# Patient Record
Sex: FEMALE | Race: WHITE | NOT HISPANIC OR LATINO
[De-identification: names, ages, dates, MRNs, and addresses within clinical notes are randomized per-mention and may not be internally consistent; named-entity substitution may affect disease eponyms.]

---

## 2020-12-14 ENCOUNTER — APPOINTMENT (OUTPATIENT)
Dept: INTERNAL MEDICINE | Facility: CLINIC | Age: 60
End: 2020-12-14

## 2021-02-08 ENCOUNTER — OUTPATIENT (OUTPATIENT)
Dept: OUTPATIENT SERVICES | Facility: HOSPITAL | Age: 61
LOS: 1 days | Discharge: HOME | End: 2021-02-08

## 2021-02-08 ENCOUNTER — APPOINTMENT (OUTPATIENT)
Dept: INTERNAL MEDICINE | Facility: CLINIC | Age: 61
End: 2021-02-08
Payer: MEDICAID

## 2021-02-08 VITALS
WEIGHT: 215 LBS | SYSTOLIC BLOOD PRESSURE: 121 MMHG | HEART RATE: 84 BPM | TEMPERATURE: 97.7 F | OXYGEN SATURATION: 98 % | BODY MASS INDEX: 35.82 KG/M2 | DIASTOLIC BLOOD PRESSURE: 76 MMHG | HEIGHT: 65 IN

## 2021-02-08 DIAGNOSIS — Z00.00 ENCOUNTER FOR GENERAL ADULT MEDICAL EXAMINATION WITHOUT ABNORMAL FINDINGS: ICD-10-CM

## 2021-02-08 DIAGNOSIS — Z85.038 PERSONAL HISTORY OF OTHER MALIGNANT NEOPLASM OF LARGE INTESTINE: ICD-10-CM

## 2021-02-08 DIAGNOSIS — E66.9 OBESITY, UNSPECIFIED: ICD-10-CM

## 2021-02-08 PROCEDURE — 99213 OFFICE O/P EST LOW 20 MIN: CPT | Mod: GC

## 2021-02-08 NOTE — PHYSICAL EXAM
[No Acute Distress] : no acute distress [Well Nourished] : well nourished [Well Developed] : well developed [Normal Sclera/Conjunctiva] : normal sclera/conjunctiva [EOMI] : extraocular movements intact [Normal Outer Ear/Nose] : the outer ears and nose were normal in appearance [Normal Oropharynx] : the oropharynx was normal [Supple] : supple [No Accessory Muscle Use] : no accessory muscle use [No Respiratory Distress] : no respiratory distress  [Clear to Auscultation] : lungs were clear to auscultation bilaterally [Normal Rate] : normal rate  [Regular Rhythm] : with a regular rhythm [Normal S1, S2] : normal S1 and S2 [No Edema] : there was no peripheral edema [Soft] : abdomen soft [Non Tender] : non-tender [No CVA Tenderness] : no CVA  tenderness [No Joint Swelling] : no joint swelling [No Rash] : no rash [Coordination Grossly Intact] : coordination grossly intact [No Focal Deficits] : no focal deficits

## 2021-02-20 NOTE — HISTORY OF PRESENT ILLNESS
[FreeTextEntry1] : Establish care [de-identified] : Ms. Gonzalez is a 61 yo female patient with PMH of colon cancer s/p surgery and chemotherapy, in remission for the last 16 years, presenting today to clinic to establish care. \par \par She has no complaints at this time, and has no concerns. Denying any complaint. Only taking Vitamin OTC medication. Denies smoking, alcohol use. Noted that sometimes she feels that her right knee might give away. \par

## 2021-02-20 NOTE — ASSESSMENT
[FreeTextEntry1] : Ms. Gonzalez is a 61 yo female patient with PMH of colon cancer s/p surgery and chemotherapy, in remission for the last 16 years, presenting today to clinic to establish care. \par \par # History of colon cancer, in remission\par - no recent changes in weight, denies hematochezia, melena, abdominal pain\par - follows with an outside GI and having regular colonoscopies\par \par # Right knee pain\par - likely due to overweight\par - Taking PRN NSAIDs\par - counseled about the importance of weight loss\par \par # HCM\par - Will request mammogram\par - Will request a referral to GYN for PaP smear\par - Will check A1c, lipid panel, TSH, CMP, CBC\par - Noted that she has received Flu shot this season. \par \par RTC in 3 months. \par

## 2021-02-20 NOTE — REVIEW OF SYSTEMS
[Fatigue] : fatigue [Negative] : Neurological [Fever] : no fever [Night Sweats] : no night sweats [Nasal Discharge] : no nasal discharge [Recent Change In Weight] : ~T no recent weight change [Sore Throat] : no sore throat [Abdominal Pain] : no abdominal pain [Nausea] : no nausea [Constipation] : no constipation [Vomiting] : no vomiting

## 2021-08-02 ENCOUNTER — APPOINTMENT (OUTPATIENT)
Dept: INTERNAL MEDICINE | Facility: CLINIC | Age: 61
End: 2021-08-02

## 2021-09-30 ENCOUNTER — APPOINTMENT (OUTPATIENT)
Dept: INTERNAL MEDICINE | Facility: CLINIC | Age: 61
End: 2021-09-30

## 2021-12-22 ENCOUNTER — APPOINTMENT (OUTPATIENT)
Dept: INTERNAL MEDICINE | Facility: CLINIC | Age: 61
End: 2021-12-22

## 2021-12-28 ENCOUNTER — EMERGENCY (EMERGENCY)
Facility: HOSPITAL | Age: 61
LOS: 0 days | Discharge: HOME | End: 2021-12-28
Attending: EMERGENCY MEDICINE | Admitting: EMERGENCY MEDICINE
Payer: MEDICAID

## 2021-12-28 ENCOUNTER — OUTPATIENT (OUTPATIENT)
Dept: OUTPATIENT SERVICES | Facility: HOSPITAL | Age: 61
LOS: 1 days | Discharge: HOME | End: 2021-12-28
Payer: MEDICAID

## 2021-12-28 ENCOUNTER — APPOINTMENT (OUTPATIENT)
Dept: OPHTHALMOLOGY | Facility: CLINIC | Age: 61
End: 2021-12-28

## 2021-12-28 VITALS
HEART RATE: 87 BPM | TEMPERATURE: 97 F | SYSTOLIC BLOOD PRESSURE: 127 MMHG | RESPIRATION RATE: 18 BRPM | DIASTOLIC BLOOD PRESSURE: 65 MMHG | OXYGEN SATURATION: 98 %

## 2021-12-28 DIAGNOSIS — T24.212A BURN OF SECOND DEGREE OF LEFT THIGH, INITIAL ENCOUNTER: ICD-10-CM
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
## 2021-12-28 DIAGNOSIS — Z23 ENCOUNTER FOR IMMUNIZATION: ICD-10-CM
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
## 2021-12-28 DIAGNOSIS — T31.0 BURNS INVOLVING LESS THAN 10% OF BODY SURFACE: ICD-10-CM

## 2021-12-28 DIAGNOSIS — T24.211A BURN OF SECOND DEGREE OF RIGHT THIGH, INITIAL ENCOUNTER: ICD-10-CM
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
## 2021-12-28 PROCEDURE — 99282 EMERGENCY DEPT VISIT SF MDM: CPT

## 2021-12-28 PROCEDURE — 99284 EMERGENCY DEPT VISIT MOD MDM: CPT

## 2021-12-28 PROCEDURE — 92250 FUNDUS PHOTOGRAPHY W/I&R: CPT | Mod: 26

## 2021-12-28 PROCEDURE — 92004 COMPRE OPH EXAM NEW PT 1/>: CPT

## 2021-12-28 RX ORDER — TETANUS TOXOID, REDUCED DIPHTHERIA TOXOID AND ACELLULAR PERTUSSIS VACCINE, ADSORBED 5; 2.5; 8; 8; 2.5 [IU]/.5ML; [IU]/.5ML; UG/.5ML; UG/.5ML; UG/.5ML
0.5 SUSPENSION INTRAMUSCULAR ONCE
Refills: 0 | Status: COMPLETED | OUTPATIENT
Start: 2021-12-28 | End: 2021-12-28

## 2021-12-28 RX ADMIN — TETANUS TOXOID, REDUCED DIPHTHERIA TOXOID AND ACELLULAR PERTUSSIS VACCINE, ADSORBED 0.5 MILLILITER(S): 5; 2.5; 8; 8; 2.5 SUSPENSION INTRAMUSCULAR at 15:21

## 2021-12-28 NOTE — ED ADULT NURSE NOTE - CAS EDP DISCH TYPE
"Chief Complaint   Patient presents with     Asthma       Initial /67  Pulse 96  Temp 98.1  F (36.7  C) (Oral)  Wt 142 lb 14.4 oz (64.8 kg)  SpO2 96% Estimated body mass index is 31.67 kg/(m^2) as calculated from the following:    Height as of 8/10/17: 4' 7.75\" (1.416 m).    Weight as of 8/10/17: 140 lb (63.5 kg).  Medication Reconciliation: complete   DIONNE Cooney      " Home

## 2021-12-28 NOTE — ED PROVIDER NOTE - NSFOLLOWUPCLINICS_GEN_ALL_ED_FT
[facility]  Burn  [address], [address]  [place] [address]  Phone: [phone]  Fax:   Follow Up Time: 1-3 Days

## 2021-12-28 NOTE — ED PROVIDER NOTE - PATIENT PORTAL LINK FT
You can access the FollowMyHealth Patient Portal offered by [facility] by registering at the following website: [email or web address]. By joining [facility]’s FollowMyHealth portal, you will also be able to view your health information using other applications (apps) compatible with our system.
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
## 2021-12-28 NOTE — ED PROVIDER NOTE - NS ED ROS FT
Constitutional: (-) diaphoresis  Eyes/ENT: (-) runny nose  Cardiovascular: (-) chest pain  Respiratory: (-) cough  Gastrointestinal: (-) vomiting, (-) diarrhea  : (-) dysuria   Musculoskeletal: (-) joint pain  Integumentary: see HPI  Neurological: (-) LOC  Allergic/Immunologic: (-) pruritus
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
## 2021-12-30 ENCOUNTER — APPOINTMENT (OUTPATIENT)
Dept: BURN CARE | Facility: CLINIC | Age: 61
End: 2021-12-30
Payer: MEDICAID

## 2021-12-30 ENCOUNTER — OUTPATIENT (OUTPATIENT)
Dept: OUTPATIENT SERVICES | Facility: HOSPITAL | Age: 61
LOS: 1 days | Discharge: HOME | End: 2021-12-30

## 2021-12-30 DIAGNOSIS — T24.211A BURN OF SECOND DEGREE OF RIGHT THIGH, INITIAL ENCOUNTER: ICD-10-CM
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
## 2021-12-30 DIAGNOSIS — T24.212A BURN OF SECOND DEGREE OF LEFT THIGH, INITIAL ENCOUNTER: ICD-10-CM
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
## 2021-12-30 PROCEDURE — 99203 OFFICE O/P NEW LOW 30 MIN: CPT

## 2021-12-31 PROBLEM — T24.212A PARTIAL THICKNESS BURN OF LEFT THIGH, INITIAL ENCOUNTER: Status: ACTIVE | Noted: 2021-12-31
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
## 2021-12-31 PROBLEM — T24.211A PARTIAL THICKNESS BURN OF RIGHT THIGH, INITIAL ENCOUNTER: Status: ACTIVE | Noted: 2021-12-31

## 2021-12-31 NOTE — REASON FOR VISIT
[Initial] : initial visit [Were you seen in the Emergency Room?] : seen in the emergency room [Were you admitted to the burn center at [facility]?] : not admitted to the burn center at [facility] [Family Member] : family member [FreeTextEntry3] : ED

## 2021-12-31 NOTE — PHYSICAL EXAM
[New] : new [Size%: ______] : Size: [unfilled]% [3] : 3 out of 10 [Normal] : normal [Medium] : medium [] : yes [de-identified] : No dressings in place \par Bilateral posterior thighs - open dessicated  wounds,, intact blisters and devitalized skin ; scattered ellen of adherent sl discolored skin  [TWNoteComboBox1] : adaptic [TWNoteComboBox2] : SSD
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
## 2022-01-03 RX ORDER — AMOXICILLIN AND CLAVULANATE POTASSIUM 875; 125 MG/1; MG/1
875-125 TABLET, COATED ORAL
Qty: 14 | Refills: 0 | Status: DISCONTINUED | COMMUNITY
Start: 2021-12-30 | End: 2022-01-03

## 2022-01-03 RX ORDER — SILVER SULFADIAZINE 10 MG/G
1 CREAM TOPICAL TWICE DAILY
Qty: 1 | Refills: 0 | Status: DISCONTINUED | COMMUNITY
Start: 2021-12-30 | End: 2022-01-03

## 2022-01-06 ENCOUNTER — NON-APPOINTMENT (OUTPATIENT)
Age: 62
End: 2022-01-06

## 2022-01-06 ENCOUNTER — OUTPATIENT (OUTPATIENT)
Dept: OUTPATIENT SERVICES | Facility: HOSPITAL | Age: 62
LOS: 1 days | Discharge: HOME | End: 2022-01-06

## 2022-01-06 ENCOUNTER — APPOINTMENT (OUTPATIENT)
Dept: INTERNAL MEDICINE | Facility: CLINIC | Age: 62
End: 2022-01-06
Payer: MEDICAID

## 2022-01-06 VITALS
SYSTOLIC BLOOD PRESSURE: 115 MMHG | TEMPERATURE: 97 F | HEIGHT: 65 IN | BODY MASS INDEX: 36.32 KG/M2 | HEART RATE: 79 BPM | DIASTOLIC BLOOD PRESSURE: 74 MMHG | WEIGHT: 218 LBS | OXYGEN SATURATION: 97 %

## 2022-01-06 DIAGNOSIS — H35.62 RETINAL HEMORRHAGE, LEFT EYE: ICD-10-CM

## 2022-01-06 DIAGNOSIS — H52.4 PRESBYOPIA: ICD-10-CM

## 2022-01-06 DIAGNOSIS — T24.009A BURN OF UNSPECIFIED DEGREE OF UNSPECIFIED SITE OF UNSPECIFIED LOWER LIMB, EXCEPT ANKLE AND FOOT, INITIAL ENCOUNTER: ICD-10-CM

## 2022-01-06 DIAGNOSIS — H35.89 OTHER SPECIFIED RETINAL DISORDERS: ICD-10-CM

## 2022-01-06 DIAGNOSIS — H25.813 COMBINED FORMS OF AGE-RELATED CATARACT, BILATERAL: ICD-10-CM

## 2022-01-06 PROCEDURE — 99214 OFFICE O/P EST MOD 30 MIN: CPT | Mod: GC

## 2022-01-06 RX ORDER — CEPHALEXIN 500 MG/1
500 TABLET ORAL EVERY 8 HOURS
Qty: 15 | Refills: 0 | Status: DISCONTINUED | COMMUNITY
Start: 2022-01-05 | End: 2022-01-06

## 2022-01-06 RX ORDER — AMOXICILLIN AND CLAVULANATE POTASSIUM 875; 125 MG/1; MG/1
875-125 TABLET, COATED ORAL
Qty: 14 | Refills: 0 | Status: DISCONTINUED | COMMUNITY
Start: 2022-01-03 | End: 2022-01-06

## 2022-01-06 NOTE — END OF VISIT
[] : A student assisted with documenting this visit. I have reviewed and verified all information documented by the student, and made modifications to such information, when appropriate. [FreeTextEntry3] : Patient was seen and examined with student.  I discussed pt's medical issues and plan in detail as written above.\par
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
## 2022-01-06 NOTE — REVIEW OF SYSTEMS
[Vision Problems] : vision problems [Hearing Loss] : hearing loss [Nocturia] : nocturia [Joint Pain] : joint pain [Fever] : no fever [Chills] : no chills [Fatigue] : no fatigue [Night Sweats] : no night sweats [Recent Change In Weight] : ~T no recent weight change [Pain] : no pain [Earache] : no earache [Chest Pain] : no chest pain [Palpitations] : no palpitations [Lower Ext Edema] : no lower extremity edema [Shortness Of Breath] : no shortness of breath [Wheezing] : no wheezing [Cough] : no cough [Abdominal Pain] : no abdominal pain [Nausea] : no nausea [Constipation] : no constipation [Diarrhea] : diarrhea [Vomiting] : no vomiting [Melena] : no melena [Dysuria] : no dysuria [Hematuria] : no hematuria [Headache] : no headache [Dizziness] : no dizziness [FreeTextEntry3] : Saw opthalmology last week [FreeTextEntry4] : Does not see an ENT, does not have a hearing aid [FreeTextEntry9] : See HPI
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
## 2022-01-13 ENCOUNTER — OUTPATIENT (OUTPATIENT)
Dept: OUTPATIENT SERVICES | Facility: HOSPITAL | Age: 62
LOS: 1 days | Discharge: HOME | End: 2022-01-13

## 2022-01-13 ENCOUNTER — APPOINTMENT (OUTPATIENT)
Dept: BURN CARE | Facility: CLINIC | Age: 62
End: 2022-01-13
Payer: MEDICAID

## 2022-01-13 LAB
25(OH)D3 SERPL-MCNC: 23 NG/ML
ALBUMIN SERPL ELPH-MCNC: 3.9 G/DL
ALP BLD-CCNC: 60 U/L
ALT SERPL-CCNC: 11 U/L
ANION GAP SERPL CALC-SCNC: 16 MMOL/L
AST SERPL-CCNC: 23 U/L
BASOPHILS # BLD AUTO: 0.06 K/UL
BASOPHILS NFR BLD AUTO: 1 %
BILIRUB SERPL-MCNC: 0.4 MG/DL
BUN SERPL-MCNC: 20 MG/DL
CALCIUM SERPL-MCNC: 9.4 MG/DL
CHLORIDE SERPL-SCNC: 108 MMOL/L
CHOLEST SERPL-MCNC: 198 MG/DL
CO2 SERPL-SCNC: 21 MMOL/L
CREAT SERPL-MCNC: 0.9 MG/DL
EOSINOPHIL # BLD AUTO: 0.13 K/UL
EOSINOPHIL NFR BLD AUTO: 2.1 %
ESTIMATED AVERAGE GLUCOSE: 120 MG/DL
GLUCOSE SERPL-MCNC: 99 MG/DL
HBA1C MFR BLD HPLC: 5.8 %
HCT VFR BLD CALC: 39.1 %
HDLC SERPL-MCNC: 73 MG/DL
HGB BLD-MCNC: 12.4 G/DL
IMM GRANULOCYTES NFR BLD AUTO: 0.7 %
LDLC SERPL CALC-MCNC: 115 MG/DL
LYMPHOCYTES # BLD AUTO: 1.06 K/UL
LYMPHOCYTES NFR BLD AUTO: 17.4 %
MAN DIFF?: NORMAL
MCHC RBC-ENTMCNC: 31.7 G/DL
MCHC RBC-ENTMCNC: 32.3 PG
MCV RBC AUTO: 101.8 FL
MONOCYTES # BLD AUTO: 0.4 K/UL
MONOCYTES NFR BLD AUTO: 6.6 %
NEUTROPHILS # BLD AUTO: 4.39 K/UL
NEUTROPHILS NFR BLD AUTO: 72.2 %
NONHDLC SERPL-MCNC: 125 MG/DL
PLATELET # BLD AUTO: 190 K/UL
POTASSIUM SERPL-SCNC: 5.1 MMOL/L
PROT SERPL-MCNC: 7.4 G/DL
RBC # BLD: 3.84 M/UL
RBC # FLD: 13.6 %
SODIUM SERPL-SCNC: 145 MMOL/L
TRIGL SERPL-MCNC: 71 MG/DL
TSH SERPL-ACNC: 3 UIU/ML
WBC # FLD AUTO: 6.08 K/UL

## 2022-01-13 PROCEDURE — 99213 OFFICE O/P EST LOW 20 MIN: CPT

## 2022-01-24 DIAGNOSIS — T24.211D BURN OF SECOND DEGREE OF RIGHT THIGH, SUBSEQUENT ENCOUNTER: ICD-10-CM

## 2022-01-24 DIAGNOSIS — T24.212D BURN OF SECOND DEGREE OF LEFT THIGH, SUBSEQUENT ENCOUNTER: ICD-10-CM
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
## 2022-02-10 ENCOUNTER — OUTPATIENT (OUTPATIENT)
Dept: OUTPATIENT SERVICES | Facility: HOSPITAL | Age: 62
LOS: 1 days | Discharge: HOME | End: 2022-02-10

## 2022-02-10 ENCOUNTER — APPOINTMENT (OUTPATIENT)
Dept: BURN CARE | Facility: CLINIC | Age: 62
End: 2022-02-10
Payer: MEDICAID

## 2022-02-10 PROCEDURE — 99213 OFFICE O/P EST LOW 20 MIN: CPT

## 2022-02-18 ENCOUNTER — APPOINTMENT (OUTPATIENT)
Dept: OPHTHALMOLOGY | Facility: CLINIC | Age: 62
End: 2022-02-18

## 2022-02-18 ENCOUNTER — OUTPATIENT (OUTPATIENT)
Dept: OUTPATIENT SERVICES | Facility: HOSPITAL | Age: 62
LOS: 1 days | Discharge: HOME | End: 2022-02-18
Payer: MEDICAID

## 2022-02-18 PROCEDURE — 92014 COMPRE OPH EXAM EST PT 1/>: CPT

## 2022-03-02 ENCOUNTER — NON-APPOINTMENT (OUTPATIENT)
Age: 62
End: 2022-03-02

## 2022-03-04 ENCOUNTER — APPOINTMENT (OUTPATIENT)
Dept: OPHTHALMOLOGY | Facility: CLINIC | Age: 62
End: 2022-03-04

## 2022-03-04 ENCOUNTER — OUTPATIENT (OUTPATIENT)
Dept: OUTPATIENT SERVICES | Facility: HOSPITAL | Age: 62
LOS: 1 days | Discharge: HOME | End: 2022-03-04
Payer: MEDICAID

## 2022-03-04 PROCEDURE — 92134 CPTRZ OPH DX IMG PST SGM RTA: CPT | Mod: 26

## 2022-03-04 PROCEDURE — 67028 INJECTION EYE DRUG: CPT | Mod: LT

## 2022-03-04 PROCEDURE — 92012 INTRM OPH EXAM EST PATIENT: CPT | Mod: 25

## 2022-03-04 PROCEDURE — 92235 FLUORESCEIN ANGRPH MLTIFRAME: CPT | Mod: 26

## 2022-03-09 DIAGNOSIS — H35.3220 EXUDATIVE AGE-RELATED MACULAR DEGENERATION, LEFT EYE, STAGE UNSPECIFIED: ICD-10-CM

## 2022-03-09 DIAGNOSIS — H25.13 AGE-RELATED NUCLEAR CATARACT, BILATERAL: ICD-10-CM

## 2022-03-09 DIAGNOSIS — H34.8322 TRIBUTARY (BRANCH) RETINAL VEIN OCCLUSION, LEFT EYE, STABLE: ICD-10-CM

## 2022-03-17 ENCOUNTER — NON-APPOINTMENT (OUTPATIENT)
Age: 62
End: 2022-03-17

## 2022-03-23 ENCOUNTER — APPOINTMENT (OUTPATIENT)
Dept: OPHTHALMOLOGY | Facility: CLINIC | Age: 62
End: 2022-03-23

## 2022-03-25 ENCOUNTER — OUTPATIENT (OUTPATIENT)
Dept: OUTPATIENT SERVICES | Facility: HOSPITAL | Age: 62
LOS: 1 days | Discharge: HOME | End: 2022-03-25
Payer: MEDICAID

## 2022-03-25 ENCOUNTER — APPOINTMENT (OUTPATIENT)
Dept: OPHTHALMOLOGY | Facility: CLINIC | Age: 62
End: 2022-03-25

## 2022-03-25 PROCEDURE — 92134 CPTRZ OPH DX IMG PST SGM RTA: CPT | Mod: 26

## 2022-03-25 PROCEDURE — 67028 INJECTION EYE DRUG: CPT | Mod: LT

## 2022-03-25 PROCEDURE — 92012 INTRM OPH EXAM EST PATIENT: CPT | Mod: 25

## 2022-03-25 PROCEDURE — 92201 OPSCPY EXTND RTA DRAW UNI/BI: CPT

## 2022-03-30 ENCOUNTER — NON-APPOINTMENT (OUTPATIENT)
Age: 62
End: 2022-03-30

## 2022-03-30 ENCOUNTER — OUTPATIENT (OUTPATIENT)
Dept: OUTPATIENT SERVICES | Facility: HOSPITAL | Age: 62
LOS: 1 days | Discharge: HOME | End: 2022-03-30

## 2022-03-30 ENCOUNTER — APPOINTMENT (OUTPATIENT)
Dept: INTERNAL MEDICINE | Facility: CLINIC | Age: 62
End: 2022-03-30
Payer: MEDICAID

## 2022-03-30 ENCOUNTER — LABORATORY RESULT (OUTPATIENT)
Age: 62
End: 2022-03-30

## 2022-03-30 VITALS
WEIGHT: 214 LBS | OXYGEN SATURATION: 100 % | HEIGHT: 65 IN | BODY MASS INDEX: 35.65 KG/M2 | HEART RATE: 69 BPM | TEMPERATURE: 98 F | SYSTOLIC BLOOD PRESSURE: 123 MMHG | DIASTOLIC BLOOD PRESSURE: 78 MMHG

## 2022-03-30 LAB
BASOPHILS # BLD AUTO: 0.03 K/UL
BASOPHILS NFR BLD AUTO: 0.7 %
EOSINOPHIL # BLD AUTO: 0.04 K/UL
EOSINOPHIL NFR BLD AUTO: 0.9 %
HCT VFR BLD CALC: 34.1 %
HGB BLD-MCNC: 12.4 G/DL
IMM GRANULOCYTES NFR BLD AUTO: 0.2 %
LYMPHOCYTES # BLD AUTO: 1.13 K/UL
LYMPHOCYTES NFR BLD AUTO: 26.2 %
MAN DIFF?: NORMAL
MCHC RBC-ENTMCNC: 36.4 G/DL
MCHC RBC-ENTMCNC: 38.3 PG
MCV RBC AUTO: 105.2 FL
MONOCYTES # BLD AUTO: 0.47 K/UL
MONOCYTES NFR BLD AUTO: 10.9 %
NEUTROPHILS # BLD AUTO: 2.63 K/UL
NEUTROPHILS NFR BLD AUTO: 61.1 %
PLATELET # BLD AUTO: 199 K/UL
RBC # BLD: 3.24 M/UL
RBC # FLD: 13.2 %
WBC # FLD AUTO: 4.31 K/UL

## 2022-03-30 PROCEDURE — 99214 OFFICE O/P EST MOD 30 MIN: CPT | Mod: GC

## 2022-04-06 ENCOUNTER — NON-APPOINTMENT (OUTPATIENT)
Age: 62
End: 2022-04-06

## 2022-04-06 NOTE — PHYSICAL EXAM
[No Acute Distress] : no acute distress [Well-Appearing] : well-appearing [Normal Outer Ear/Nose] : the outer ears and nose were normal in appearance [Normal] : affect was normal and insight and judgment were intact [de-identified] : overweight [de-identified] : cerumen obstructing tympanic membrane of L ear, R ear clear, no edema/erythema

## 2022-04-06 NOTE — ASSESSMENT
[FreeTextEntry1] : Ms. Gonzalez is a 59 yo female patient with PMH of colon cancer s/p surgery and chemotherapy, in remission for the last 16 years, presenting today for annual comprehensive checkup.\par \par # History of colon cancer, in remission\par - no recent changes in weight, denies hematochezia, melena, abdominal pain\par - follows with an outside GI and having regular colonoscopies, has appt in May\par \par # Right knee pain\par - likely due to overweight\par - Taking PRN NSAIDs\par - counseled about the importance of weight loss\par \par # Hearing loss\par - Ear drops for cerumen impaction\par \par # HCM\par - Will request mammogram\par - Will request a referral to GYN for PaP smear\par - Will check A1c, lipid panel, TSH, CMP, CBC\par - Noted that she has received Flu shot this season. \par \par #Prediabetes\par - A1C 5.8, counseled on avoiding sugary beverages\par \par #DLD\par - \par \par RTC in 3 months.

## 2022-04-06 NOTE — REVIEW OF SYSTEMS
[Redness] : redness [Hearing Loss] : hearing loss [Memory Loss] : memory loss [Negative] : Gastrointestinal [Discharge] : no discharge [Pain] : no pain [Vision Problems] : no vision problems [Earache] : no earache [Nasal Discharge] : no nasal discharge [Headache] : no headache [Dizziness] : no dizziness

## 2022-04-06 NOTE — HISTORY OF PRESENT ILLNESS
[Family Member] : family member [FreeTextEntry1] : annual exam [de-identified] : Patient is here with sister Keke who is her legal guardian. Keke reports that patient has had progressive hearing loss over the past few months as well as worsening memory. Keke also reports that the patient is experiencing worsening knee pain that is helped after she takes Alleve.

## 2022-04-07 ENCOUNTER — APPOINTMENT (OUTPATIENT)
Dept: INTERNAL MEDICINE | Facility: CLINIC | Age: 62
End: 2022-04-07

## 2022-04-08 ENCOUNTER — NON-APPOINTMENT (OUTPATIENT)
Age: 62
End: 2022-04-08

## 2022-04-08 LAB
APPEARANCE: CLEAR
BILIRUBIN URINE: NEGATIVE
BLOOD URINE: ABNORMAL
COLOR: YELLOW
ESTIMATED AVERAGE GLUCOSE: 120 MG/DL
GLUCOSE QUALITATIVE U: NORMAL
HBA1C MFR BLD HPLC: 5.8 %
HBV SURFACE AB SER QL: NONREACTIVE
HBV SURFACE AB SERPL IA-ACNC: <3 MIU/ML
HBV SURFACE AG SER QL: NONREACTIVE
KETONES URINE: NEGATIVE
LEUKOCYTE ESTERASE URINE: NEGATIVE
M TB IFN-G BLD-IMP: NEGATIVE
MEV IGG FLD QL IA: 33.6 AU/ML
MEV IGG+IGM SER-IMP: POSITIVE
MUV AB SER-ACNC: POSITIVE
MUV IGG SER QL IA: 34.2 AU/ML
NITRITE URINE: NEGATIVE
PH URINE: 6
PROTEIN URINE: ABNORMAL
QUANTIFERON TB PLUS MITOGEN MINUS NIL: 2.05 IU/ML
QUANTIFERON TB PLUS NIL: 0 IU/ML
QUANTIFERON TB PLUS TB1 MINUS NIL: 0 IU/ML
QUANTIFERON TB PLUS TB2 MINUS NIL: 0 IU/ML
RUBV IGG FLD-ACNC: 2.2 INDEX
RUBV IGG SER-IMP: POSITIVE
SPECIFIC GRAVITY URINE: 1.03
UROBILINOGEN URINE: NORMAL
VZV AB TITR SER: POSITIVE
VZV IGG SER IF-ACNC: 3076 INDEX

## 2022-04-11 DIAGNOSIS — H61.20 IMPACTED CERUMEN, UNSPECIFIED EAR: ICD-10-CM

## 2022-04-11 DIAGNOSIS — Z00.00 ENCOUNTER FOR GENERAL ADULT MEDICAL EXAMINATION WITHOUT ABNORMAL FINDINGS: ICD-10-CM

## 2022-04-11 DIAGNOSIS — E66.9 OBESITY, UNSPECIFIED: ICD-10-CM

## 2022-04-12 ENCOUNTER — MED ADMIN CHARGE (OUTPATIENT)
Age: 62
End: 2022-04-12

## 2022-04-12 ENCOUNTER — OUTPATIENT (OUTPATIENT)
Dept: OUTPATIENT SERVICES | Facility: HOSPITAL | Age: 62
LOS: 1 days | Discharge: HOME | End: 2022-04-12

## 2022-04-12 ENCOUNTER — APPOINTMENT (OUTPATIENT)
Dept: NEUROLOGY | Facility: CLINIC | Age: 62
End: 2022-04-12
Payer: MEDICAID

## 2022-04-12 VITALS
HEART RATE: 72 BPM | HEIGHT: 66 IN | BODY MASS INDEX: 34.87 KG/M2 | TEMPERATURE: 98.2 F | DIASTOLIC BLOOD PRESSURE: 83 MMHG | WEIGHT: 217 LBS | OXYGEN SATURATION: 98 % | SYSTOLIC BLOOD PRESSURE: 139 MMHG

## 2022-04-12 PROCEDURE — 99204 OFFICE O/P NEW MOD 45 MIN: CPT | Mod: GC

## 2022-04-12 NOTE — END OF VISIT
[] : Resident [FreeTextEntry3] : Patient examined.  Suspect early dementia (AD).  Will get imaging and dementia labs and f/u with ENT for ear wax removal.\par Anticipate needing donepezil soon. Will f/u after above.

## 2022-04-12 NOTE — REVIEW OF SYSTEMS
[Memory Lapses or Loss] : memory loss [Decr. Concentrating Ability] : decreased concentrating ability [Change In Personality] : personality change [Loss Of Hearing] : hearing loss [Negative] : Respiratory [Confused or Disoriented] : no confusion [Difficulty with Language] : no ~M difficulty with language [Changed Thought Patterns] : no change in thought patterns [Repeating Questions] : no repeated questioning about recent events [Facial Weakness] : no facial weakness [Arm Weakness] : no arm weakness [Hand Weakness] : no hand weakness [Leg Weakness] : no leg weakness [Poor Coordination] : good coordination [Difficulty Writing] : no difficulty writing [Difficulties in Speech] : no speech difficulties [Numbness] : no numbness [Tingling] : no tingling [Abnormal Sensation] : no abnormal sensation [Hypersensitivity] : no hypersensitivity [Seizures] : no convulsions [Dizziness] : no dizziness [Fainting] : no fainting [Lightheadedness] : no lightheadedness [Vertigo] : no vertigo [Cluster Headache] : no cluster headache [Migraine Headache] : no migraine headache [Tension Headache] : no tension-type headache [Difficulty Walking] : no difficulty walking [Inability to Walk] : able to walk [Ataxia] : no ataxia [Frequent Falls] : not falling [Limping] : not limping [Earache] : no earache [Nosebleeds] : no nosebleeds [Nasal Discharge] : no nasal discharge [Sore Throat] : no sore throat [Hoarseness] : no hoarseness [de-identified] : Anger Outbursts (Non-violent) only verbal frustration and venting

## 2022-04-12 NOTE — HISTORY OF PRESENT ILLNESS
[FreeTextEntry1] : Patient is here with sister Keke who is her legal guardian. Keke reports that patient has had progressive hearing loss over the past few months as well as worsening memory. Keke also reports that the patient is experiencing anger outburst secondary to inability to remember where she placed items for the past 6 months. \par

## 2022-04-12 NOTE — ASSESSMENT
[FreeTextEntry1] : Ms. Gonzalez is a 59 yo female patient with PMH of colon cancer s/p surgery and chemotherapy, in remission for the last 16 years, referred to us for 6 months history of memory loss. \par \par # Worsening Memory Loss &  Anger Outbursts\par - Early onset Dementia (Hx. of Alzheimer's in mother) vs. Secondary to hearing loss \par - Evaluate hearing loss after cerumen disimpaction by ENT\par - Route Head CT, folate, b12, TSH \par - TdaP given today \par - RTC in 3 months\par

## 2022-04-12 NOTE — PHYSICAL EXAM
[General Appearance - Alert] : alert [General Appearance - In No Acute Distress] : in no acute distress [General Appearance - Well Nourished] : well nourished [General Appearance - Well Developed] : well developed [General Appearance - Well-Appearing] : healthy appearing [] : normal voice and communication [Oriented To Time, Place, And Person] : oriented to person, place, and time [Impaired Insight] : insight and judgment were intact [Affect] : the affect was normal [Mood] : the mood was normal [Memory Recent] : recent memory was not impaired [Memory Remote] : remote memory was not impaired [Person] : oriented to person [Place] : oriented to place [Time] : oriented to time [Remote Intact] : remote memory intact [Registration Intact] : recent registration memory intact [Span Intact] : the attention span was normal [Concentration Intact] : normal concentrating ability [Visual Intact] : visual attention was ~T not ~L decreased [Naming Objects] : no difficulty naming common objects [Repeating Phrases] : no difficulty repeating a phrase [Writing A Sentence] : no difficulty writing a sentence [Fluency] : fluency intact [Comprehension] : comprehension intact [Reading] : reading intact [Current Events] : adequate knowledge of current events [Past History] : adequate knowledge of personal past history [Vocabulary] : adequate range of vocabulary [Cranial Nerves Optic (II)] : visual acuity intact bilaterally,  visual fields full to confrontation, pupils equal round and reactive to light [Cranial Nerves Oculomotor (III)] : extraocular motion intact [Cranial Nerves Trigeminal (V)] : facial sensation intact symmetrically [Cranial Nerves Facial (VII)] : face symmetrical [Cranial Nerves Vestibulocochlear (VIII)] : hearing was intact bilaterally [Cranial Nerves Glossopharyngeal (IX)] : tongue and palate midline [Cranial Nerves Accessory (XI - Cranial And Spinal)] : head turning and shoulder shrug symmetric [Cranial Nerves Hypoglossal (XII)] : there was no tongue deviation with protrusion [Motor Tone] : muscle tone was normal in all four extremities [Motor Strength] : muscle strength was normal in all four extremities [Involuntary Movements] : no involuntary movements were seen [No Muscle Atrophy] : normal bulk in all four extremities [Motor Handedness Right-Handed] : the patient is right hand dominant [Sensation Tactile Decrease] : light touch was intact [Sensation Vibration Decrease] : vibration was intact [Abnormal Walk] : normal gait [Balance] : balance was intact [2+] : Patella left 2+ [Optic Disc Abnormality] : the optic disc were normal in size and color [Respiration, Rhythm And Depth] : normal respiratory rhythm and effort [Auscultation Breath Sounds / Voice Sounds] : lungs were clear to auscultation bilaterally [Chest Palpation] : palpation of the chest revealed no abnormalities [Lungs Percussion] : the lungs were normal to percussion [Apical Impulse] : the apical impulse was normal [Heart Sounds] : normal S1 and S2 [FreeTextEntry1] : 2/3 on recall item test  [Short Term Intact] : short term memory impaired [Paresis Pronator Drift Right-Sided] : no pronator drift on the right [Paresis Pronator Drift Left-Sided] : no pronator drift on the left [Motor Strength Upper Extremities Right] : strength was normal in the right upper extremity [Motor Strength Upper Extremities Left] : strength was normal in the left upper extremity [Motor Strength Lower Extremities Right] : strength was normal in the right lower extremity [Motor Strength Lower Extremities Left] : strength was normal in the left lower extremity [Romberg's Sign] : Romberg's sign was negtive [Dysesthesia] : no dysesthesia

## 2022-04-13 ENCOUNTER — OUTPATIENT (OUTPATIENT)
Dept: OUTPATIENT SERVICES | Facility: HOSPITAL | Age: 62
LOS: 1 days | Discharge: HOME | End: 2022-04-13
Payer: MEDICAID

## 2022-04-13 ENCOUNTER — APPOINTMENT (OUTPATIENT)
Dept: OPHTHALMOLOGY | Facility: CLINIC | Age: 62
End: 2022-04-13

## 2022-04-13 PROCEDURE — 92012 INTRM OPH EXAM EST PATIENT: CPT

## 2022-04-14 DIAGNOSIS — H35.89 OTHER SPECIFIED RETINAL DISORDERS: ICD-10-CM

## 2022-04-14 DIAGNOSIS — H35.62 RETINAL HEMORRHAGE, LEFT EYE: ICD-10-CM

## 2022-04-14 DIAGNOSIS — H52.4 PRESBYOPIA: ICD-10-CM

## 2022-04-19 ENCOUNTER — NON-APPOINTMENT (OUTPATIENT)
Age: 62
End: 2022-04-19

## 2022-04-29 ENCOUNTER — NON-APPOINTMENT (OUTPATIENT)
Age: 62
End: 2022-04-29

## 2022-05-03 ENCOUNTER — NON-APPOINTMENT (OUTPATIENT)
Age: 62
End: 2022-05-03

## 2022-05-11 ENCOUNTER — NON-APPOINTMENT (OUTPATIENT)
Age: 62
End: 2022-05-11

## 2022-05-13 ENCOUNTER — NON-APPOINTMENT (OUTPATIENT)
Age: 62
End: 2022-05-13

## 2022-06-24 ENCOUNTER — OUTPATIENT (OUTPATIENT)
Dept: OUTPATIENT SERVICES | Facility: HOSPITAL | Age: 62
LOS: 1 days | Discharge: HOME | End: 2022-06-24

## 2022-06-24 ENCOUNTER — APPOINTMENT (OUTPATIENT)
Dept: OPHTHALMOLOGY | Facility: CLINIC | Age: 62
End: 2022-06-24

## 2022-06-24 PROCEDURE — 92014 COMPRE OPH EXAM EST PT 1/>: CPT

## 2022-06-24 PROCEDURE — 92201 OPSCPY EXTND RTA DRAW UNI/BI: CPT

## 2022-06-24 PROCEDURE — 92134 CPTRZ OPH DX IMG PST SGM RTA: CPT | Mod: 26

## 2022-06-29 ENCOUNTER — OUTPATIENT (OUTPATIENT)
Dept: OUTPATIENT SERVICES | Facility: HOSPITAL | Age: 62
LOS: 1 days | Discharge: HOME | End: 2022-06-29

## 2022-06-29 ENCOUNTER — APPOINTMENT (OUTPATIENT)
Dept: OPHTHALMOLOGY | Facility: CLINIC | Age: 62
End: 2022-06-29

## 2022-06-29 PROCEDURE — 92012 INTRM OPH EXAM EST PATIENT: CPT

## 2022-06-29 PROCEDURE — 92201 OPSCPY EXTND RTA DRAW UNI/BI: CPT

## 2022-06-29 PROCEDURE — 92134 CPTRZ OPH DX IMG PST SGM RTA: CPT | Mod: 26

## 2022-06-30 ENCOUNTER — NON-APPOINTMENT (OUTPATIENT)
Age: 62
End: 2022-06-30

## 2022-06-30 DIAGNOSIS — H52.7 UNSPECIFIED DISORDER OF REFRACTION: ICD-10-CM

## 2022-06-30 DIAGNOSIS — H25.13 AGE-RELATED NUCLEAR CATARACT, BILATERAL: ICD-10-CM

## 2022-06-30 DIAGNOSIS — H34.8322 TRIBUTARY (BRANCH) RETINAL VEIN OCCLUSION, LEFT EYE, STABLE: ICD-10-CM

## 2022-07-01 ENCOUNTER — OUTPATIENT (OUTPATIENT)
Dept: OUTPATIENT SERVICES | Facility: HOSPITAL | Age: 62
LOS: 1 days | Discharge: HOME | End: 2022-07-01

## 2022-07-01 ENCOUNTER — APPOINTMENT (OUTPATIENT)
Dept: INTERNAL MEDICINE | Facility: CLINIC | Age: 62
End: 2022-07-01

## 2022-07-01 VITALS
OXYGEN SATURATION: 96 % | HEIGHT: 66 IN | HEART RATE: 92 BPM | TEMPERATURE: 97.9 F | DIASTOLIC BLOOD PRESSURE: 68 MMHG | WEIGHT: 223 LBS | BODY MASS INDEX: 35.84 KG/M2 | SYSTOLIC BLOOD PRESSURE: 102 MMHG

## 2022-07-01 DIAGNOSIS — H11.32 CONJUNCTIVAL HEMORRHAGE, LEFT EYE: ICD-10-CM

## 2022-07-01 PROCEDURE — 99214 OFFICE O/P EST MOD 30 MIN: CPT | Mod: GC

## 2022-07-01 NOTE — ASSESSMENT
[FreeTextEntry1] : 63 yo F patient with a PMH of prediabetes and colon cancer s/p resection 30 years ago, presents for skin pimple over her left breast.\par \par # Left breast skin Pimple\par Does not seem to be infected\par No underlying palpable mass, discharge, or any lymphadenopathy\par Will do a mammogram AP and lateral as she is also due\par \par # Right sided knee arthralgia\par No red flags or concerns of septic/ inflammatory arthritis\par Might be due to underlying osteoarthritis\par Will order right knee xray AP and lateral\par \par # Left-sided subconjunctival hemorrhage\par Due to left intravitreal injections a couple of days ago\par Surveillance only\par \par # Prediabetes\par Instructed about dietary modifications and exercise as tolerated\par \par Patient is seeing the GI next month and might get a referral for colonoscopy\par Will need a Pap smear\par Vaccinations are up-to-date

## 2022-07-01 NOTE — REVIEW OF SYSTEMS
[Negative] : Heme/Lymph [Joint Pain] : joint pain [FreeTextEntry9] : Right knee pain, mechanical in nature, no inflammatory features

## 2022-07-01 NOTE — PHYSICAL EXAM
[No Acute Distress] : no acute distress [Well Nourished] : well nourished [Well Developed] : well developed [Well-Appearing] : well-appearing [No JVD] : no jugular venous distention [No Lymphadenopathy] : no lymphadenopathy [Normal Rate] : normal rate  [Regular Rhythm] : with a regular rhythm [Normal S1, S2] : normal S1 and S2 [Normal Appearance] : normal in appearance [No Nipple Discharge] : no nipple discharge [No Axillary Lymphadenopathy] : no axillary lymphadenopathy [Soft] : abdomen soft [Non Tender] : non-tender [Non-distended] : non-distended [No Joint Swelling] : no joint swelling [Grossly Normal Strength/Tone] : grossly normal strength/tone [Speech Grossly Normal] : speech grossly normal [Normal Affect] : the affect was normal [Alert and Oriented x3] : oriented to person, place, and time [Normal Insight/Judgement] : insight and judgment were intact [de-identified] : Left-sided subconjunctival hemorrhage [de-identified] : Blue pimple over the left breast skin [de-identified] : No features of inflammation over the left knee

## 2022-07-01 NOTE — HISTORY OF PRESENT ILLNESS
[Family Member] : family member [FreeTextEntry8] : 61 yo F patient with a PMH of prediabetes and colon cancer in remission, presents to the clinic for a complaint of left breast cutaneous pimple.\par \par Patient noted that she had a bluish pimple over her left breast's skin, no erythema, induration, underlying palpable masses, swelling in the left armpit, or any discharges from the nipple.\par \par Patient also noted right knee pain, that is present on exertion, relieved on rest, non-radiating, with no associated symptoms, no red flags, no other arthralgias, no constitutional symptoms.\par \par ROS is negative.

## 2022-07-05 DIAGNOSIS — M25.561 PAIN IN RIGHT KNEE: ICD-10-CM

## 2022-07-05 DIAGNOSIS — H11.32 CONJUNCTIVAL HEMORRHAGE, LEFT EYE: ICD-10-CM

## 2022-07-05 DIAGNOSIS — R73.03 PREDIABETES: ICD-10-CM

## 2022-07-05 DIAGNOSIS — R23.8 OTHER SKIN CHANGES: ICD-10-CM

## 2022-07-05 DIAGNOSIS — M25.562 PAIN IN LEFT KNEE: ICD-10-CM

## 2022-08-10 ENCOUNTER — APPOINTMENT (OUTPATIENT)
Dept: OPHTHALMOLOGY | Facility: CLINIC | Age: 62
End: 2022-08-10

## 2022-08-10 ENCOUNTER — OUTPATIENT (OUTPATIENT)
Dept: OUTPATIENT SERVICES | Facility: HOSPITAL | Age: 62
LOS: 1 days | Discharge: HOME | End: 2022-08-10

## 2022-08-10 PROCEDURE — 92134 CPTRZ OPH DX IMG PST SGM RTA: CPT | Mod: 26

## 2022-08-10 PROCEDURE — 92012 INTRM OPH EXAM EST PATIENT: CPT | Mod: 25

## 2022-08-10 PROCEDURE — 92202 OPSCPY EXTND ON/MAC DRAW: CPT

## 2022-08-10 PROCEDURE — 67028 INJECTION EYE DRUG: CPT | Mod: LT

## 2022-08-29 ENCOUNTER — OUTPATIENT (OUTPATIENT)
Dept: OUTPATIENT SERVICES | Facility: HOSPITAL | Age: 62
LOS: 1 days | Discharge: HOME | End: 2022-08-29

## 2022-08-29 ENCOUNTER — APPOINTMENT (OUTPATIENT)
Dept: PODIATRY | Facility: CLINIC | Age: 62
End: 2022-08-29

## 2022-08-29 DIAGNOSIS — M77.52 OTHER ENTHESOPATHY OF LT FOOT AND ANKLE: ICD-10-CM

## 2022-08-29 DIAGNOSIS — M79.89 OTHER SPECIFIED SOFT TISSUE DISORDERS: ICD-10-CM

## 2022-08-29 PROCEDURE — 99203 OFFICE O/P NEW LOW 30 MIN: CPT | Mod: NC,GC

## 2022-08-30 PROBLEM — M77.52 BURSITIS OF LEFT FOOT: Status: ACTIVE | Noted: 2022-08-30

## 2022-08-30 PROBLEM — M79.89 SOFT TISSUE MASS: Status: ACTIVE | Noted: 2022-08-29

## 2022-08-30 NOTE — ASSESSMENT
[FreeTextEntry1] : 62 F presents with Left foot soft tissue mass. \par \par Rx: MRI left foot. rule out bursitis vs cyst. \par \par RTC in 2 weeks.

## 2022-08-30 NOTE — HISTORY OF PRESENT ILLNESS
[FreeTextEntry1] : Pt presents with lump on the lateral aspect for the left foot. Reports at least a month no pain no redness.   Has not had this problem before. \par \par Denies fever, N , V, SOB, C, CP.

## 2022-08-30 NOTE — END OF VISIT
[] : Resident [FreeTextEntry3] : soft tissue mass lateral to the 5th styloid. Well circumscribed mass measuring approximately 2cm. non tender. no overlying skin changes. non-tender\par referred for MRI \par Follow up after MRI to discuss possible surgical management vs corticosteriod injection

## 2022-08-30 NOTE — PHYSICAL EXAM
[2+] : left foot dorsalis pedis 2+ [Skin Color & Pigmentation] : normal skin color and pigmentation [FreeTextEntry1] : Left lateral foto at the 5th metatarsal base

## 2022-09-06 ENCOUNTER — APPOINTMENT (OUTPATIENT)
Dept: NEUROLOGY | Facility: CLINIC | Age: 62
End: 2022-09-06

## 2022-09-12 ENCOUNTER — APPOINTMENT (OUTPATIENT)
Dept: PODIATRY | Facility: CLINIC | Age: 62
End: 2022-09-12

## 2022-09-23 ENCOUNTER — NON-APPOINTMENT (OUTPATIENT)
Age: 62
End: 2022-09-23

## 2022-09-28 ENCOUNTER — NON-APPOINTMENT (OUTPATIENT)
Age: 62
End: 2022-09-28

## 2022-09-29 ENCOUNTER — NON-APPOINTMENT (OUTPATIENT)
Age: 62
End: 2022-09-29

## 2022-10-04 ENCOUNTER — OUTPATIENT (OUTPATIENT)
Dept: OUTPATIENT SERVICES | Facility: HOSPITAL | Age: 62
LOS: 1 days | Discharge: HOME | End: 2022-10-04

## 2022-10-04 ENCOUNTER — APPOINTMENT (OUTPATIENT)
Dept: SPEECH THERAPY | Facility: CLINIC | Age: 62
End: 2022-10-04

## 2022-10-04 DIAGNOSIS — H90.3 SENSORINEURAL HEARING LOSS, BILATERAL: ICD-10-CM

## 2022-10-05 ENCOUNTER — APPOINTMENT (OUTPATIENT)
Dept: OPHTHALMOLOGY | Facility: CLINIC | Age: 62
End: 2022-10-05

## 2022-10-05 ENCOUNTER — OUTPATIENT (OUTPATIENT)
Dept: OUTPATIENT SERVICES | Facility: HOSPITAL | Age: 62
LOS: 1 days | Discharge: HOME | End: 2022-10-05

## 2022-10-05 LAB
M TB IFN-G BLD-IMP: NEGATIVE
QUANTIFERON TB PLUS MITOGEN MINUS NIL: 6.14 IU/ML
QUANTIFERON TB PLUS NIL: 0.02 IU/ML
QUANTIFERON TB PLUS TB1 MINUS NIL: 0.01 IU/ML
QUANTIFERON TB PLUS TB2 MINUS NIL: 0.03 IU/ML

## 2022-10-05 PROCEDURE — 67028 INJECTION EYE DRUG: CPT | Mod: LT

## 2022-10-05 PROCEDURE — 92012 INTRM OPH EXAM EST PATIENT: CPT | Mod: 25

## 2022-10-05 PROCEDURE — 92134 CPTRZ OPH DX IMG PST SGM RTA: CPT | Mod: 26

## 2022-10-05 RX ORDER — BEVACIZUMAB 400 MG/16ML
0.05 INJECTION, SOLUTION INTRAVENOUS ONCE
Refills: 0 | Status: DISCONTINUED | OUTPATIENT
Start: 2022-10-05 | End: 2022-10-19

## 2022-10-20 ENCOUNTER — NON-APPOINTMENT (OUTPATIENT)
Age: 62
End: 2022-10-20

## 2022-10-24 ENCOUNTER — NON-APPOINTMENT (OUTPATIENT)
Age: 62
End: 2022-10-24

## 2022-11-02 ENCOUNTER — NON-APPOINTMENT (OUTPATIENT)
Age: 62
End: 2022-11-02

## 2022-11-02 ENCOUNTER — APPOINTMENT (OUTPATIENT)
Dept: GASTROENTEROLOGY | Facility: CLINIC | Age: 62
End: 2022-11-02

## 2022-11-02 ENCOUNTER — OUTPATIENT (OUTPATIENT)
Dept: OUTPATIENT SERVICES | Facility: HOSPITAL | Age: 62
LOS: 1 days | Discharge: HOME | End: 2022-11-02

## 2022-11-02 VITALS
BODY MASS INDEX: 35.84 KG/M2 | HEIGHT: 66 IN | OXYGEN SATURATION: 98 % | SYSTOLIC BLOOD PRESSURE: 132 MMHG | TEMPERATURE: 97.9 F | WEIGHT: 223 LBS | HEART RATE: 71 BPM | DIASTOLIC BLOOD PRESSURE: 81 MMHG

## 2022-11-02 DIAGNOSIS — Z78.9 OTHER SPECIFIED HEALTH STATUS: ICD-10-CM

## 2022-11-02 PROCEDURE — 99204 OFFICE O/P NEW MOD 45 MIN: CPT | Mod: GC

## 2022-11-02 NOTE — REVIEW OF SYSTEMS
[As Noted in HPI] : as noted in HPI [Fever] : no fever [Chills] : no chills [Loss Of Hearing] : no hearing loss [Chest Pain] : no chest pain [Palpitations] : no palpitations [Shortness Of Breath] : no shortness of breath [SOB on Exertion] : no shortness of breath during exertion [Abdominal Pain] : no abdominal pain [Vomiting] : no vomiting [Constipation] : no constipation [Heartburn] : no heartburn [Bleeding] : no bleeding [Bloating (gassiness)] : no bloating [Confused] : no confusion [Convulsions] : no convulsions [Dizziness] : no dizziness [Anxiety] : no anxiety [Depression] : no depression

## 2022-11-02 NOTE — HISTORY OF PRESENT ILLNESS
[FreeTextEntry1] : 62 year old female with PMH of Colon cancer s/p Rt hemicolectomy and chemo 25 years ago. was following up with Dr Springer for surveillance colonoscopy, last colonoscopy was > 5 years ago. Presents today for regular follow up for surveillance colonoscopy. Denies any hematochezia, melena, weight loss, abd pain or any other gi complaints.

## 2022-11-02 NOTE — PHYSICAL EXAM
[Alert] : alert [Normal Voice/Communication] : normal voice/communication [Healthy Appearing] : healthy appearing [Sclera] : the sclera and conjunctiva were normal [Hearing Threshold Finger Rub Not [place]] : hearing was normal [Normal Lips/Gums] : the lips and gums were normal [Oropharynx] : the oropharynx was normal [Normal Appearance] : the appearance of the neck was normal [No Neck Mass] : no neck mass was observed [No Respiratory Distress] : no respiratory distress [No Acc Muscle Use] : no accessory muscle use [Respiration, Rhythm And Depth] : normal respiratory rhythm and effort [Auscultation Breath Sounds / Voice Sounds] : lungs were clear to auscultation bilaterally [Heart Rate And Rhythm] : heart rate was normal and rhythm regular [Normal S1, S2] : normal S1 and S2 [Murmurs] : no murmurs [Bowel Sounds] : normal bowel sounds [Abdomen Tenderness] : non-tender [No Masses] : no abdominal mass palpated [Abdomen Soft] : soft [No CVA Tenderness] : no CVA  tenderness [Abnormal Walk] : normal gait [Normal Color / Pigmentation] : normal skin color and pigmentation [Oriented To Time, Place, And Person] : oriented to person, place, and time

## 2022-11-09 DIAGNOSIS — Z12.11 ENCOUNTER FOR SCREENING FOR MALIGNANT NEOPLASM OF COLON: ICD-10-CM

## 2022-11-09 DIAGNOSIS — Z78.9 OTHER SPECIFIED HEALTH STATUS: ICD-10-CM

## 2022-11-16 ENCOUNTER — APPOINTMENT (OUTPATIENT)
Dept: OPHTHALMOLOGY | Facility: CLINIC | Age: 62
End: 2022-11-16

## 2022-11-16 ENCOUNTER — OUTPATIENT (OUTPATIENT)
Dept: OUTPATIENT SERVICES | Facility: HOSPITAL | Age: 62
LOS: 1 days | Discharge: HOME | End: 2022-11-16

## 2022-11-16 PROCEDURE — 67028 INJECTION EYE DRUG: CPT | Mod: LT

## 2022-11-16 PROCEDURE — 92134 CPTRZ OPH DX IMG PST SGM RTA: CPT | Mod: 26

## 2022-12-19 ENCOUNTER — LABORATORY RESULT (OUTPATIENT)
Age: 62
End: 2022-12-19

## 2022-12-19 ENCOUNTER — APPOINTMENT (OUTPATIENT)
Dept: INTERNAL MEDICINE | Facility: CLINIC | Age: 62
End: 2022-12-19

## 2022-12-19 ENCOUNTER — OUTPATIENT (OUTPATIENT)
Dept: OUTPATIENT SERVICES | Facility: HOSPITAL | Age: 62
LOS: 1 days | Discharge: HOME | End: 2022-12-19

## 2022-12-19 VITALS
OXYGEN SATURATION: 100 % | SYSTOLIC BLOOD PRESSURE: 130 MMHG | TEMPERATURE: 97.8 F | WEIGHT: 223 LBS | HEIGHT: 66 IN | DIASTOLIC BLOOD PRESSURE: 86 MMHG | BODY MASS INDEX: 35.84 KG/M2 | HEART RATE: 72 BPM

## 2022-12-19 PROCEDURE — 99214 OFFICE O/P EST MOD 30 MIN: CPT | Mod: GC

## 2022-12-19 NOTE — REVIEW OF SYSTEMS
[Joint Pain] : joint pain [Negative] : Heme/Lymph [FreeTextEntry9] : Right knee pain, mechanical in nature, no inflammatory features

## 2022-12-19 NOTE — HISTORY OF PRESENT ILLNESS
[FreeTextEntry1] : routine f/u, no acute cplaints  [de-identified] : 63 yo F patient with a PMH of prediabetes and colon cancer in remission, presents to the clinic for a f/u. Pt has hearing problems and is in the process of getting a hearing aid. Has diarrhea often, scheduled for colonscopy in January. \par Patient accompanied by sister\par

## 2022-12-19 NOTE — ASSESSMENT
[FreeTextEntry1] : 61 yo F patient with a PMH of prediabetes and colon cancer s/p resection 30 years ago, presents for skin pimple over her left breast.\par \par # Left breast skin Pimple - resolved \par Pending mammogram \par Mammogram Cancer Screening;\par Patient counseled on the importance of a yearly mammogram screening and directed to have test performed or follow-up with OB/GYN. Failure to perform test can result in breast cancer and death\par \par \par #Knee pain - resolved - possibly 2/2 OA\par \par #Obesity \par diet and exercise advised\par Obesity;\par Diet/Exercise Counseled\par Patient was counseled on changing their diet to low fat, low carbohydrates and low cholesterol.\par Patient was also counseled on increasing their activity to 45 minutes of exercise daily.\par \par \par #Hearing loss - BL \par L ear has cerumen impaction - advised debrox ear drops \par may need hearing aid - follows ENT / audiology\par \par #Colon cancer s/p surgery and chemo now in remission for almost 17 years \par denies B symptoms\par \par # Prediabetes\par Instructed about dietary modifications and exercise as tolerated\par pre Diabetes;\par Low sugar, low carbohydrate diet \par Exercise Counseled \par Patient given opportunity to discuss frequency and target blood sugar levels\par Patient educated on symptoms of hypo/hyperglycemic events \par Counseled on: Yearly Ophthalmology and Podiatry Exam\par \par Obesity;\par Diet/Exercise Counseled\par Patient was counseled on changing their diet to low fat, low carbohydrates and low cholesterol.\par Patient was also counseled on increasing their activity to 45 minutes of exercise daily.\par \par \par \par Patient is seeing the GI and is schedule for colonoscopy\par Colon Cancer Screening;\par Patient counseled on the importance of colonoscopy screening and directed to follow-up with GI doctor. Failure to perform test can result in Colon Cancer and Death.\par \par Will need a Pap smear- cervical cancer screening/pap- counselling provided\par \par routine labs prior to next visit\par F/u in 6 months \par Flu shot today \par Vaccine;\par All vaccine side effects discussed with pt prior to injection.\par Tolerated well by patient.\par Patient instructed to return to office if any side effects; including, but not limited to, rash, fever or vomiting occur.\par

## 2022-12-19 NOTE — PHYSICAL EXAM
[No Acute Distress] : no acute distress [Well Nourished] : well nourished [Well Developed] : well developed [Well-Appearing] : well-appearing [No JVD] : no jugular venous distention [No Lymphadenopathy] : no lymphadenopathy [Normal Rate] : normal rate  [Regular Rhythm] : with a regular rhythm [Normal S1, S2] : normal S1 and S2 [Normal Appearance] : normal in appearance [No Nipple Discharge] : no nipple discharge [No Axillary Lymphadenopathy] : no axillary lymphadenopathy [Soft] : abdomen soft [Non Tender] : non-tender [Non-distended] : non-distended [No Joint Swelling] : no joint swelling [Grossly Normal Strength/Tone] : grossly normal strength/tone [Speech Grossly Normal] : speech grossly normal [Normal Affect] : the affect was normal [Alert and Oriented x3] : oriented to person, place, and time [Normal Insight/Judgement] : insight and judgment were intact [de-identified] : L ear cerumen impaction [de-identified] : Blue pimple over the left breast skin [de-identified] : obese [de-identified] : No features of inflammation over the left knee

## 2022-12-20 DIAGNOSIS — H61.20 IMPACTED CERUMEN, UNSPECIFIED EAR: ICD-10-CM

## 2022-12-20 DIAGNOSIS — Z00.00 ENCOUNTER FOR GENERAL ADULT MEDICAL EXAMINATION WITHOUT ABNORMAL FINDINGS: ICD-10-CM

## 2022-12-20 DIAGNOSIS — E66.9 OBESITY, UNSPECIFIED: ICD-10-CM

## 2022-12-20 DIAGNOSIS — Z23 ENCOUNTER FOR IMMUNIZATION: ICD-10-CM

## 2022-12-20 DIAGNOSIS — R23.8 OTHER SKIN CHANGES: ICD-10-CM

## 2022-12-20 DIAGNOSIS — R73.03 PREDIABETES: ICD-10-CM

## 2022-12-20 DIAGNOSIS — R68.89 OTHER GENERAL SYMPTOMS AND SIGNS: ICD-10-CM

## 2022-12-22 ENCOUNTER — NON-APPOINTMENT (OUTPATIENT)
Age: 62
End: 2022-12-22

## 2022-12-28 LAB
ALBUMIN SERPL ELPH-MCNC: 4.7 G/DL
ALP BLD-CCNC: 69 U/L
ALT SERPL-CCNC: 15 U/L
ANION GAP SERPL CALC-SCNC: 12 MMOL/L
APPEARANCE: CLEAR
AST SERPL-CCNC: 21 U/L
BASOPHILS # BLD AUTO: 0.02 K/UL
BASOPHILS NFR BLD AUTO: 0.5 %
BILIRUB SERPL-MCNC: 0.5 MG/DL
BILIRUBIN URINE: NEGATIVE
BLOOD URINE: NEGATIVE
BUN SERPL-MCNC: 18 MG/DL
CALCIUM SERPL-MCNC: 9.8 MG/DL
CHLORIDE SERPL-SCNC: 101 MMOL/L
CHOLEST SERPL-MCNC: 235 MG/DL
CO2 SERPL-SCNC: 27 MMOL/L
COLOR: YELLOW
CREAT SERPL-MCNC: 0.8 MG/DL
EGFR: 84 ML/MIN/1.73M2
EOSINOPHIL # BLD AUTO: 0.05 K/UL
EOSINOPHIL NFR BLD AUTO: 1.2 %
ESTIMATED AVERAGE GLUCOSE: 128 MG/DL
GLUCOSE QUALITATIVE U: NEGATIVE
GLUCOSE SERPL-MCNC: 101 MG/DL
HBA1C MFR BLD HPLC: 6.1 %
HCT VFR BLD CALC: 38 %
HDLC SERPL-MCNC: 92 MG/DL
HGB BLD-MCNC: 13.1 G/DL
IMM GRANULOCYTES NFR BLD AUTO: 0.2 %
KETONES URINE: NEGATIVE
LDLC SERPL CALC-MCNC: 128 MG/DL
LEUKOCYTE ESTERASE URINE: NEGATIVE
LYMPHOCYTES # BLD AUTO: 0.94 K/UL
LYMPHOCYTES NFR BLD AUTO: 21.9 %
MAN DIFF?: NORMAL
MCHC RBC-ENTMCNC: 34.5 G/DL
MCHC RBC-ENTMCNC: 36 PG
MCV RBC AUTO: 104.4 FL
MONOCYTES # BLD AUTO: 0.41 K/UL
MONOCYTES NFR BLD AUTO: 9.6 %
NEUTROPHILS # BLD AUTO: 2.86 K/UL
NEUTROPHILS NFR BLD AUTO: 66.6 %
NITRITE URINE: NEGATIVE
NONHDLC SERPL-MCNC: 143 MG/DL
PH URINE: 6
PLATELET # BLD AUTO: 213 K/UL
POTASSIUM SERPL-SCNC: 4.6 MMOL/L
PROT SERPL-MCNC: 7.7 G/DL
PROTEIN URINE: ABNORMAL
RBC # BLD: 3.64 M/UL
RBC # FLD: 13.3 %
SODIUM SERPL-SCNC: 140 MMOL/L
SPECIFIC GRAVITY URINE: 1.03
TRIGL SERPL-MCNC: 75 MG/DL
TSH SERPL-ACNC: 2.11 UIU/ML
UROBILINOGEN URINE: NORMAL
WBC # FLD AUTO: 4.29 K/UL

## 2023-01-04 ENCOUNTER — APPOINTMENT (OUTPATIENT)
Dept: OPHTHALMOLOGY | Facility: CLINIC | Age: 63
End: 2023-01-04
Payer: MEDICAID

## 2023-01-04 ENCOUNTER — OUTPATIENT (OUTPATIENT)
Dept: OUTPATIENT SERVICES | Facility: HOSPITAL | Age: 63
LOS: 1 days | Discharge: HOME | End: 2023-01-04

## 2023-01-04 PROCEDURE — 67028 INJECTION EYE DRUG: CPT | Mod: LT

## 2023-01-04 PROCEDURE — 92134 CPTRZ OPH DX IMG PST SGM RTA: CPT | Mod: 26

## 2023-01-04 RX ORDER — BEVACIZUMAB 400 MG/16ML
0.05 INJECTION, SOLUTION INTRAVENOUS ONCE
Refills: 0 | Status: DISCONTINUED | OUTPATIENT
Start: 2023-01-04 | End: 2023-01-18

## 2023-01-05 ENCOUNTER — NON-APPOINTMENT (OUTPATIENT)
Age: 63
End: 2023-01-05

## 2023-02-08 ENCOUNTER — APPOINTMENT (OUTPATIENT)
Dept: GASTROENTEROLOGY | Facility: CLINIC | Age: 63
End: 2023-02-08

## 2023-02-15 ENCOUNTER — APPOINTMENT (OUTPATIENT)
Dept: OPHTHALMOLOGY | Facility: CLINIC | Age: 63
End: 2023-02-15
Payer: MEDICAID

## 2023-02-15 ENCOUNTER — APPOINTMENT (OUTPATIENT)
Dept: OPHTHALMOLOGY | Facility: CLINIC | Age: 63
End: 2023-02-15

## 2023-02-15 ENCOUNTER — OUTPATIENT (OUTPATIENT)
Dept: OUTPATIENT SERVICES | Facility: HOSPITAL | Age: 63
LOS: 1 days | End: 2023-02-15
Payer: MEDICAID

## 2023-02-15 DIAGNOSIS — H53.8 OTHER VISUAL DISTURBANCES: ICD-10-CM

## 2023-02-15 PROCEDURE — 92134 CPTRZ OPH DX IMG PST SGM RTA: CPT

## 2023-02-15 PROCEDURE — 92134 CPTRZ OPH DX IMG PST SGM RTA: CPT | Mod: 26

## 2023-02-15 PROCEDURE — 67028 INJECTION EYE DRUG: CPT | Mod: LT

## 2023-02-15 PROCEDURE — 92012 INTRM OPH EXAM EST PATIENT: CPT

## 2023-02-21 DIAGNOSIS — H34.8392 TRIBUTARY (BRANCH) RETINAL VEIN OCCLUSION, UNSPECIFIED EYE, STABLE: ICD-10-CM

## 2023-03-29 ENCOUNTER — APPOINTMENT (OUTPATIENT)
Dept: INTERNAL MEDICINE | Facility: CLINIC | Age: 63
End: 2023-03-29

## 2023-04-19 ENCOUNTER — APPOINTMENT (OUTPATIENT)
Dept: OPHTHALMOLOGY | Facility: CLINIC | Age: 63
End: 2023-04-19
Payer: MEDICAID

## 2023-04-19 ENCOUNTER — OUTPATIENT (OUTPATIENT)
Dept: OUTPATIENT SERVICES | Facility: HOSPITAL | Age: 63
LOS: 1 days | End: 2023-04-19
Payer: MEDICAID

## 2023-04-19 DIAGNOSIS — H53.8 OTHER VISUAL DISTURBANCES: ICD-10-CM

## 2023-04-19 PROCEDURE — 92014 COMPRE OPH EXAM EST PT 1/>: CPT

## 2023-04-19 PROCEDURE — 92134 CPTRZ OPH DX IMG PST SGM RTA: CPT

## 2023-04-19 PROCEDURE — 92134 CPTRZ OPH DX IMG PST SGM RTA: CPT | Mod: 26

## 2023-04-26 DIAGNOSIS — H34.8392 TRIBUTARY (BRANCH) RETINAL VEIN OCCLUSION, UNSPECIFIED EYE, STABLE: ICD-10-CM

## 2023-05-10 ENCOUNTER — APPOINTMENT (OUTPATIENT)
Dept: INTERNAL MEDICINE | Facility: CLINIC | Age: 63
End: 2023-05-10
Payer: MEDICAID

## 2023-05-10 ENCOUNTER — OUTPATIENT (OUTPATIENT)
Dept: OUTPATIENT SERVICES | Facility: HOSPITAL | Age: 63
LOS: 1 days | End: 2023-05-10
Payer: MEDICAID

## 2023-05-10 VITALS
OXYGEN SATURATION: 98 % | SYSTOLIC BLOOD PRESSURE: 118 MMHG | DIASTOLIC BLOOD PRESSURE: 80 MMHG | HEIGHT: 66 IN | HEART RATE: 75 BPM | TEMPERATURE: 97.2 F | WEIGHT: 238 LBS | BODY MASS INDEX: 38.25 KG/M2

## 2023-05-10 DIAGNOSIS — L81.9 DISORDER OF PIGMENTATION, UNSPECIFIED: ICD-10-CM

## 2023-05-10 DIAGNOSIS — M17.11 UNILATERAL PRIMARY OSTEOARTHRITIS, RIGHT KNEE: ICD-10-CM

## 2023-05-10 DIAGNOSIS — Z00.00 ENCOUNTER FOR GENERAL ADULT MEDICAL EXAMINATION WITHOUT ABNORMAL FINDINGS: ICD-10-CM

## 2023-05-10 DIAGNOSIS — R23.8 OTHER SKIN CHANGES: ICD-10-CM

## 2023-05-10 PROCEDURE — 99214 OFFICE O/P EST MOD 30 MIN: CPT

## 2023-05-10 PROCEDURE — 80053 COMPREHEN METABOLIC PANEL: CPT

## 2023-05-10 PROCEDURE — 83036 HEMOGLOBIN GLYCOSYLATED A1C: CPT

## 2023-05-10 PROCEDURE — 85025 COMPLETE CBC W/AUTO DIFF WBC: CPT

## 2023-05-10 PROCEDURE — 82570 ASSAY OF URINE CREATININE: CPT

## 2023-05-10 PROCEDURE — 99214 OFFICE O/P EST MOD 30 MIN: CPT | Mod: GC

## 2023-05-10 PROCEDURE — 84443 ASSAY THYROID STIM HORMONE: CPT

## 2023-05-10 PROCEDURE — 84156 ASSAY OF PROTEIN URINE: CPT

## 2023-05-10 PROCEDURE — 80061 LIPID PANEL: CPT

## 2023-05-10 NOTE — ASSESSMENT
[FreeTextEntry1] : 61 yo F patient with a PMH of prediabetes and colon cancer s/p resection 30 years ago here for f/u\par \par # Left breast skin hyperpigmented lesion\par Pending mammogram \par Mammogram Cancer Screening;\par Patient counseled on the importance of a yearly mammogram screening and directed to have test performed or follow-up with OB/GYN. Failure to perform test can result in breast cancer and death\par derm eval\par \par #Knee pain -  possibly 2/2 OA 2/2 obesity, vs degenerative meniscal dx medially on the R knee\par - weight loss and dietary counseling \par - FU R knee Xray \par supportive tx topical analgesia and otc tylenol at 650mg po, counselled how/when to take and regarding SE profile\par \par #Hearing loss - BL \par L ear has cerumen impaction - advised debrox ear drops\par in the process of getting a hearing aid.  \par may need hearing aid - follows ENT / audiology\par \par #Colon cancer s/p surgery and chemo now in remission for almost 17 years \par denies B symptoms\par gi for colorectal cancer screenings\par \par # Prediabetes\par last  A1c 6.1 in 12/22\par Instructed about dietary modifications and exercise as tolerated\par pre Diabetes;\par Low sugar, low carbohydrate diet \par Exercise Counseled \par Patient given opportunity to discuss frequency and target blood sugar levels\par Patient educated on symptoms of hypo/hyperglycemic events \par Counseled on: Yearly Ophthalmology and Podiatry Exam\par \par HLD:\par last in 12/22 \par Hyperlipidemia;\par Low fat, low cholesterol diet.\par Discussed importance of eating a heart healthy diet\par Counseled on aerobic exercise and weight loss\par Fasting lipid panel every 3 months\par Treatment options and possible side effects discussed \par Smoking cessation discussed, if applicable\par Patient counseled on effects of ETOH on lipid levels\par \par \par Obesity;\par Diet/Exercise Counseled\par Patient was counseled on changing their diet to low fat, low carbohydrates and low cholesterol.\par Patient was also counseled on increasing their activity to 45 minutes of exercise daily.\par \par \par HCM\par Patient is seeing the GI and is schedule for colonoscopy\par Colon Cancer Screening;\par colonoscopy on monday \par Patient counseled on the importance of colonoscopy screening and directed to follow-up with GI doctor. Failure to perform test can result in Colon Cancer and Death.\par \par Will need a Pap smear- cervical cancer screening/pap- counselling provided\par \par routine labs prior to next visit\par F/u in 6 months \par \par I saw the patient, examined the patient independently, and provided all the services. Agree with resident note as personally edited above.\par

## 2023-05-10 NOTE — HISTORY OF PRESENT ILLNESS
[FreeTextEntry1] : routine f/u [de-identified] : 61 yo F patient with a PMH of lower leg varicose veins, prediabetes and colon cancer in remission, presents to the clinic for a f/u. Pt has hearing problems and is in the process of getting a hearing aid. scheduled for colonoscopy on monday \par Patient also reports pimples  on bilateral breast \par Patient accompanied by sister\par Patient reports pain in the right knee, with stiffness,

## 2023-05-10 NOTE — PHYSICAL EXAM
[No Acute Distress] : no acute distress [Well-Appearing] : well-appearing [Normal Sclera/Conjunctiva] : normal sclera/conjunctiva [PERRL] : pupils equal round and reactive to light [EOMI] : extraocular movements intact [Normal Outer Ear/Nose] : the outer ears and nose were normal in appearance [Normal Oropharynx] : the oropharynx was normal [No JVD] : no jugular venous distention [No Lymphadenopathy] : no lymphadenopathy [Supple] : supple [Thyroid Normal, No Nodules] : the thyroid was normal and there were no nodules present [No Respiratory Distress] : no respiratory distress  [No Accessory Muscle Use] : no accessory muscle use [Clear to Auscultation] : lungs were clear to auscultation bilaterally [Normal Rate] : normal rate  [Regular Rhythm] : with a regular rhythm [Normal S1, S2] : normal S1 and S2 [No Murmur] : no murmur heard [No Carotid Bruits] : no carotid bruits [No Abdominal Bruit] : a ~M bruit was not heard ~T in the abdomen [No Varicosities] : no varicosities [Pedal Pulses Present] : the pedal pulses are present [No Edema] : there was no peripheral edema [No Palpable Aorta] : no palpable aorta [No Extremity Clubbing/Cyanosis] : no extremity clubbing/cyanosis [Soft] : abdomen soft [Non Tender] : non-tender [Non-distended] : non-distended [No Masses] : no abdominal mass palpated [No HSM] : no HSM [Normal Bowel Sounds] : normal bowel sounds [Normal Posterior Cervical Nodes] : no posterior cervical lymphadenopathy [Normal Anterior Cervical Nodes] : no anterior cervical lymphadenopathy [No CVA Tenderness] : no CVA  tenderness [No Spinal Tenderness] : no spinal tenderness [No Joint Swelling] : no joint swelling [Grossly Normal Strength/Tone] : grossly normal strength/tone [No Rash] : no rash [Coordination Grossly Intact] : coordination grossly intact [No Focal Deficits] : no focal deficits [Normal Gait] : normal gait [Deep Tendon Reflexes (DTR)] : deep tendon reflexes were 2+ and symmetric [Normal Affect] : the affect was normal [Normal Insight/Judgement] : insight and judgment were intact [de-identified] : obese [de-identified] : edema in both legs seconadry to varcies  [de-identified] : no ligamentous laxity in R knee, or palpable joint effusion/erythema/warmth/rubor

## 2023-05-11 ENCOUNTER — NON-APPOINTMENT (OUTPATIENT)
Age: 63
End: 2023-05-11

## 2023-05-11 DIAGNOSIS — D53.1 OTHER MEGALOBLASTIC ANEMIAS, NOT ELSEWHERE CLASSIFIED: ICD-10-CM

## 2023-05-11 LAB
ALBUMIN SERPL ELPH-MCNC: 4.6 G/DL
ALP BLD-CCNC: 63 U/L
ALT SERPL-CCNC: 12 U/L
ANION GAP SERPL CALC-SCNC: 12 MMOL/L
AST SERPL-CCNC: 19 U/L
BILIRUB SERPL-MCNC: 0.5 MG/DL
BUN SERPL-MCNC: 16 MG/DL
CALCIUM SERPL-MCNC: 9.4 MG/DL
CHLORIDE SERPL-SCNC: 105 MMOL/L
CHOLEST SERPL-MCNC: 224 MG/DL
CO2 SERPL-SCNC: 25 MMOL/L
CREAT SERPL-MCNC: 0.9 MG/DL
CREAT SPEC-SCNC: 318 MG/DL
CREAT/PROT UR: 0.1 RATIO
EGFR: 72 ML/MIN/1.73M2
ESTIMATED AVERAGE GLUCOSE: 123 MG/DL
GLUCOSE SERPL-MCNC: 100 MG/DL
HBA1C MFR BLD HPLC: 5.9 %
HDLC SERPL-MCNC: 87 MG/DL
LDLC SERPL CALC-MCNC: 123 MG/DL
NONHDLC SERPL-MCNC: 137 MG/DL
POTASSIUM SERPL-SCNC: 4.1 MMOL/L
PROT SERPL-MCNC: 7 G/DL
PROT UR-MCNC: 25 MG/DLG/24H
SODIUM SERPL-SCNC: 142 MMOL/L
TRIGL SERPL-MCNC: 70 MG/DL
TSH SERPL-ACNC: 2.48 UIU/ML

## 2023-05-13 ENCOUNTER — RESULT REVIEW (OUTPATIENT)
Age: 63
End: 2023-05-13

## 2023-05-13 ENCOUNTER — OUTPATIENT (OUTPATIENT)
Dept: OUTPATIENT SERVICES | Facility: HOSPITAL | Age: 63
LOS: 1 days | End: 2023-05-13
Payer: MEDICAID

## 2023-05-13 DIAGNOSIS — M25.561 PAIN IN RIGHT KNEE: ICD-10-CM

## 2023-05-13 PROCEDURE — 73562 X-RAY EXAM OF KNEE 3: CPT | Mod: RT

## 2023-05-13 PROCEDURE — 73562 X-RAY EXAM OF KNEE 3: CPT | Mod: 26,RT

## 2023-05-14 DIAGNOSIS — M25.561 PAIN IN RIGHT KNEE: ICD-10-CM

## 2023-05-24 DIAGNOSIS — Z00.00 ENCOUNTER FOR GENERAL ADULT MEDICAL EXAMINATION WITHOUT ABNORMAL FINDINGS: ICD-10-CM

## 2023-05-24 DIAGNOSIS — M17.11 UNILATERAL PRIMARY OSTEOARTHRITIS, RIGHT KNEE: ICD-10-CM

## 2023-05-24 DIAGNOSIS — E66.9 OBESITY, UNSPECIFIED: ICD-10-CM

## 2023-05-24 DIAGNOSIS — L81.9 DISORDER OF PIGMENTATION, UNSPECIFIED: ICD-10-CM

## 2023-05-24 DIAGNOSIS — R68.89 OTHER GENERAL SYMPTOMS AND SIGNS: ICD-10-CM

## 2023-05-24 DIAGNOSIS — H61.20 IMPACTED CERUMEN, UNSPECIFIED EAR: ICD-10-CM

## 2023-05-24 DIAGNOSIS — R23.3 SPONTANEOUS ECCHYMOSES: ICD-10-CM

## 2023-05-24 DIAGNOSIS — R73.03 PREDIABETES: ICD-10-CM

## 2023-06-07 ENCOUNTER — OUTPATIENT (OUTPATIENT)
Dept: OUTPATIENT SERVICES | Facility: HOSPITAL | Age: 63
LOS: 1 days | End: 2023-06-07
Payer: MEDICAID

## 2023-06-07 ENCOUNTER — APPOINTMENT (OUTPATIENT)
Dept: OPHTHALMOLOGY | Facility: CLINIC | Age: 63
End: 2023-06-07
Payer: MEDICAID

## 2023-06-07 DIAGNOSIS — H34.8392 TRIBUTARY (BRANCH) RETINAL VEIN OCCLUSION, UNSPECIFIED EYE, STABLE: ICD-10-CM

## 2023-06-07 DIAGNOSIS — H53.8 OTHER VISUAL DISTURBANCES: ICD-10-CM

## 2023-06-07 PROCEDURE — 92012 INTRM OPH EXAM EST PATIENT: CPT

## 2023-06-07 PROCEDURE — 92134 CPTRZ OPH DX IMG PST SGM RTA: CPT | Mod: 26

## 2023-06-07 PROCEDURE — 67028 INJECTION EYE DRUG: CPT | Mod: LT

## 2023-06-07 PROCEDURE — 92134 CPTRZ OPH DX IMG PST SGM RTA: CPT

## 2023-06-07 PROCEDURE — 92012 INTRM OPH EXAM EST PATIENT: CPT | Mod: 25

## 2023-06-27 ENCOUNTER — OUTPATIENT (OUTPATIENT)
Dept: OUTPATIENT SERVICES | Facility: HOSPITAL | Age: 63
LOS: 1 days | End: 2023-06-27
Payer: MEDICAID

## 2023-06-27 ENCOUNTER — APPOINTMENT (OUTPATIENT)
Dept: DERMATOLOGY | Facility: CLINIC | Age: 63
End: 2023-06-27

## 2023-06-27 DIAGNOSIS — Z00.00 ENCOUNTER FOR GENERAL ADULT MEDICAL EXAMINATION WITHOUT ABNORMAL FINDINGS: ICD-10-CM

## 2023-06-27 DIAGNOSIS — L70.0 ACNE VULGARIS: ICD-10-CM

## 2023-06-27 DIAGNOSIS — I83.90 ASYMPTOMATIC VARICOSE VEINS OF UNSPECIFIED LOWER EXTREMITY: ICD-10-CM

## 2023-06-27 PROCEDURE — 99202 OFFICE O/P NEW SF 15 MIN: CPT

## 2023-06-27 NOTE — HISTORY OF PRESENT ILLNESS
[de-identified] : 64 yo F patient with a PMH of lower leg varicose veins, prediabetes and colon cancer in remission, presented for 2 main complaints. \par First, she has been having back comedones on bilateral breasts for the past 3 months. Not painful, non itchy and self-resolving. \par Second, patient has varicose veins in B/L feet for couple of months for which she applied powder on it. Legs do not get swollen or itchy.

## 2023-06-27 NOTE — END OF VISIT
[] : Resident [FreeTextEntry3] : Asymptomatic mild varicose veins and occasional open comedones on breasts, no treatment needed

## 2023-06-27 NOTE — ASSESSMENT
[FreeTextEntry1] : 64 yo F patient with a PMH of lower leg varicose veins, prediabetes and colon cancer in remission, presented for 2 main complaints. \par \par #Comedones\par - one at a time on bilateral breasts; asymptomatic \par - no treatment for now; if starting to cause symptoms, can start on topical benzoyl peroxide.  \par \par #Varicose veins \par - Asymptomatic\par - Unless pain, swelling or itching -> vascular referral

## 2023-07-07 ENCOUNTER — OUTPATIENT (OUTPATIENT)
Dept: OUTPATIENT SERVICES | Facility: HOSPITAL | Age: 63
LOS: 1 days | End: 2023-07-07
Payer: MEDICAID

## 2023-07-07 ENCOUNTER — APPOINTMENT (OUTPATIENT)
Dept: PODIATRY | Facility: CLINIC | Age: 63
End: 2023-07-07
Payer: MEDICAID

## 2023-07-07 ENCOUNTER — LABORATORY RESULT (OUTPATIENT)
Age: 63
End: 2023-07-07

## 2023-07-07 ENCOUNTER — OUTPATIENT (OUTPATIENT)
Dept: OUTPATIENT SERVICES | Facility: HOSPITAL | Age: 63
LOS: 1 days | End: 2023-07-07

## 2023-07-07 DIAGNOSIS — Z00.00 ENCOUNTER FOR GENERAL ADULT MEDICAL EXAMINATION WITHOUT ABNORMAL FINDINGS: ICD-10-CM

## 2023-07-07 DIAGNOSIS — R73.03 PREDIABETES.: ICD-10-CM

## 2023-07-07 PROCEDURE — 99213 OFFICE O/P EST LOW 20 MIN: CPT | Mod: NC,GC

## 2023-07-07 PROCEDURE — 99213 OFFICE O/P EST LOW 20 MIN: CPT

## 2023-07-08 DIAGNOSIS — Z00.00 ENCOUNTER FOR GENERAL ADULT MEDICAL EXAMINATION WITHOUT ABNORMAL FINDINGS: ICD-10-CM

## 2023-07-09 LAB
APPEARANCE: CLEAR
BILIRUBIN URINE: NEGATIVE
BLOOD URINE: NEGATIVE
COLOR: YELLOW
GLUCOSE QUALITATIVE U: NEGATIVE
HBV CORE IGG+IGM SER QL: NONREACTIVE
HBV SURFACE AB SER QL: NONREACTIVE
HBV SURFACE AB SERPL IA-ACNC: <3 MIU/ML
HBV SURFACE AG SER QL: NONREACTIVE
KETONES URINE: NEGATIVE
LEUKOCYTE ESTERASE URINE: NEGATIVE
NITRITE URINE: NEGATIVE
PH URINE: 6
PROTEIN URINE: ABNORMAL
SPECIFIC GRAVITY URINE: >=1.03
UROBILINOGEN URINE: NORMAL

## 2023-07-11 ENCOUNTER — NON-APPOINTMENT (OUTPATIENT)
Age: 63
End: 2023-07-11

## 2023-07-11 PROBLEM — R73.03 PREDIABETES: Status: ACTIVE | Noted: 2022-07-01

## 2023-07-11 NOTE — PHYSICAL EXAM
[Ankle Swelling (On Exam)] : present [Ankle Swelling Bilaterally] : bilaterally  [Varicose Veins Of Lower Extremities] : bilaterally [Ankle Swelling On The Right] : mild [2+] : left foot dorsalis pedis 2+ [] : normal strength/tone [Sensation] : the sensory exam was normal to light touch and pinprick [Oriented To Time, Place, And Person] : oriented to person, place, and time [Impaired Insight] : insight and judgment were intact [Delayed in the Right Toes] : capillary refills normal in right toes [Delayed in the Left Toes] : capillary refills normal in the left toes [Foot Ulcer] : no foot ulcer [FreeTextEntry1] : some slight residual hyperpigmentation to the LLE\par Nails elongated with subungual debris [Vibration Dec.] : normal vibratory sensation at the level of the toes [Diminished Throughout Right Foot] : normal sensation with monofilament testing throughout right foot [Diminished Throughout Left Foot] : normal sensation with monofilament testing throughout left foot

## 2023-07-11 NOTE — ASSESSMENT
[FreeTextEntry1] : Pre diabetic footcare\par -patient was seen and evaluated today\par -physical exam showed elongated and slightly dystrophic nails\par -debrided WOI x10\par -no other abnormalities noted at this time\par -pt to check her feet daily and return sooner if issues arise\par \par rtc 3 months nail care

## 2023-07-11 NOTE — HISTORY OF PRESENT ILLNESS
[FreeTextEntry1] : new patient is pre-diabetic here for diabetic foot evaluation upon referral from primary. Her only complaint is elongated nails which sometimes catch on her socks and shoes. no other acute problems at this time.

## 2023-07-11 NOTE — END OF VISIT
[] : Resident [Resident] : Resident [FreeTextEntry3] : diiabetic at risk for exam performed \par Modified ADA Diabetic Foot Risk Classification 0 \par A1c reviewed  5.9% 5/2023\par -Loss of protective sensation:   no\par -Presence of foot deformity:   no \par -presence of pre-ulcerative lesion:  no\par   -hemorrhage into callus:  no\par -atrophy of heel or metatarsal fat pads:  no\par \par -Diabetic neurovascular foot assessment  performed. \par -Discussed with patient diabetic foot hygiene.Patient instructed to regularly check the bottom of the feet\par -Patient given a diabetic foot education sheet\par -Return 6 month\par \par \par  [FreeTextEntry2] : -nails debrided to patients tolerance\par

## 2023-07-12 DIAGNOSIS — R73.03 PREDIABETES: ICD-10-CM

## 2023-07-12 DIAGNOSIS — L60.3 NAIL DYSTROPHY: ICD-10-CM

## 2023-07-13 LAB
M TB IFN-G BLD-IMP: NEGATIVE
QUANTIFERON TB PLUS MITOGEN MINUS NIL: 8.57 IU/ML
QUANTIFERON TB PLUS NIL: 0.03 IU/ML
QUANTIFERON TB PLUS TB1 MINUS NIL: -0.01 IU/ML
QUANTIFERON TB PLUS TB2 MINUS NIL: -0.02 IU/ML

## 2023-07-17 ENCOUNTER — APPOINTMENT (OUTPATIENT)
Dept: INTERNAL MEDICINE | Facility: CLINIC | Age: 63
End: 2023-07-17
Payer: MEDICAID

## 2023-07-17 ENCOUNTER — NON-APPOINTMENT (OUTPATIENT)
Age: 63
End: 2023-07-17

## 2023-07-17 ENCOUNTER — OUTPATIENT (OUTPATIENT)
Dept: OUTPATIENT SERVICES | Facility: HOSPITAL | Age: 63
LOS: 1 days | End: 2023-07-17
Payer: MEDICAID

## 2023-07-17 ENCOUNTER — APPOINTMENT (OUTPATIENT)
Dept: INTERNAL MEDICINE | Facility: CLINIC | Age: 63
End: 2023-07-17

## 2023-07-17 VITALS
TEMPERATURE: 98 F | OXYGEN SATURATION: 99 % | SYSTOLIC BLOOD PRESSURE: 91 MMHG | HEART RATE: 59 BPM | DIASTOLIC BLOOD PRESSURE: 69 MMHG | WEIGHT: 240 LBS | BODY MASS INDEX: 38.57 KG/M2 | HEIGHT: 66 IN

## 2023-07-17 DIAGNOSIS — Z00.00 ENCOUNTER FOR GENERAL ADULT MEDICAL EXAMINATION WITHOUT ABNORMAL FINDINGS: ICD-10-CM

## 2023-07-17 DIAGNOSIS — E66.9 OBESITY, UNSPECIFIED: ICD-10-CM

## 2023-07-17 DIAGNOSIS — Z23 ENCOUNTER FOR IMMUNIZATION: ICD-10-CM

## 2023-07-17 DIAGNOSIS — M25.562 PAIN IN LEFT KNEE: ICD-10-CM

## 2023-07-17 DIAGNOSIS — H61.20 IMPACTED CERUMEN, UNSPECIFIED EAR: ICD-10-CM

## 2023-07-17 PROCEDURE — 99214 OFFICE O/P EST MOD 30 MIN: CPT

## 2023-07-17 PROCEDURE — 99214 OFFICE O/P EST MOD 30 MIN: CPT | Mod: GC

## 2023-07-17 PROCEDURE — 90471 IMMUNIZATION ADMIN: CPT | Mod: 25

## 2023-07-17 PROCEDURE — 90746 HEPB VACCINE 3 DOSE ADULT IM: CPT

## 2023-07-17 NOTE — ASSESSMENT
[FreeTextEntry1] : cerumen still impaced\par ENT for ear cleaning\par debrox ggt\par hearing aid pending, following c audiology too\par \par Obesity;\par Diet/Exercise Counseled\par Patient was counseled on changing their diet to low fat, low carbohydrates and low cholesterol.\par Patient was also counseled on increasing their activity to 45 minutes of exercise daily.\par \par knee OA\par topical analgesia, controlled\par \par hcm\par Mammogram Cancer Screening;\par Patient counseled on the importance of a yearly mammogram screening and directed to have test performed or follow-up with OB/GYN. Failure to perform test can result in breast cancer and death\par pending\par Colon Cancer Screening;\par Patient counseled on the importance of colonoscopy screening and directed to follow-up with GI doctor. Failure to perform test can result in Colon Cancer and Death.\par \par rtc in 3 mo\par

## 2023-07-17 NOTE — HISTORY OF PRESENT ILLNESS
[FreeTextEntry1] : routine f/u, obesity, knee OA [de-identified] : feels well. knee pain well controlled c otc topical analgesia\par walking\par not dieting\par needs forms filled out\par needs hep b vaccine

## 2023-07-17 NOTE — PHYSICAL EXAM
[No Acute Distress] : no acute distress [No Accessory Muscle Use] : no accessory muscle use [Clear to Auscultation] : lungs were clear to auscultation bilaterally [Regular Rhythm] : with a regular rhythm [Normal S1, S2] : normal S1 and S2 [Soft] : abdomen soft [Non Tender] : non-tender [Non-distended] : non-distended [No Focal Deficits] : no focal deficits [de-identified] : nona b/l ears [de-identified] : obese [de-identified] : b/l knee crepitus [de-identified] : mild developmental delay, here c sister

## 2023-07-19 ENCOUNTER — NON-APPOINTMENT (OUTPATIENT)
Age: 63
End: 2023-07-19

## 2023-07-19 ENCOUNTER — APPOINTMENT (OUTPATIENT)
Dept: OPHTHALMOLOGY | Facility: CLINIC | Age: 63
End: 2023-07-19
Payer: MEDICAID

## 2023-07-19 ENCOUNTER — OUTPATIENT (OUTPATIENT)
Dept: OUTPATIENT SERVICES | Facility: HOSPITAL | Age: 63
LOS: 1 days | End: 2023-07-19
Payer: MEDICAID

## 2023-07-19 DIAGNOSIS — H53.8 OTHER VISUAL DISTURBANCES: ICD-10-CM

## 2023-07-19 PROCEDURE — 67028 INJECTION EYE DRUG: CPT | Mod: LT

## 2023-07-19 PROCEDURE — 92134 CPTRZ OPH DX IMG PST SGM RTA: CPT | Mod: 26

## 2023-07-25 ENCOUNTER — NON-APPOINTMENT (OUTPATIENT)
Age: 63
End: 2023-07-25

## 2023-08-16 ENCOUNTER — OUTPATIENT (OUTPATIENT)
Dept: OUTPATIENT SERVICES | Facility: HOSPITAL | Age: 63
LOS: 1 days | End: 2023-08-16
Payer: MEDICAID

## 2023-08-16 ENCOUNTER — APPOINTMENT (OUTPATIENT)
Dept: OPHTHALMOLOGY | Facility: CLINIC | Age: 63
End: 2023-08-16
Payer: MEDICAID

## 2023-08-16 DIAGNOSIS — H34.8322 TRIBUTARY (BRANCH) RETINAL VEIN OCCLUSION, LEFT EYE, STABLE: ICD-10-CM

## 2023-08-16 DIAGNOSIS — H53.8 OTHER VISUAL DISTURBANCES: ICD-10-CM

## 2023-08-16 PROCEDURE — 92014 COMPRE OPH EXAM EST PT 1/>: CPT | Mod: 25

## 2023-08-16 PROCEDURE — 67028 INJECTION EYE DRUG: CPT | Mod: LT

## 2023-08-16 PROCEDURE — 92134 CPTRZ OPH DX IMG PST SGM RTA: CPT | Mod: 26,LT

## 2023-08-16 PROCEDURE — 92134 CPTRZ OPH DX IMG PST SGM RTA: CPT

## 2023-08-16 PROCEDURE — 92014 COMPRE OPH EXAM EST PT 1/>: CPT

## 2023-08-18 ENCOUNTER — APPOINTMENT (OUTPATIENT)
Dept: INTERNAL MEDICINE | Facility: CLINIC | Age: 63
End: 2023-08-18

## 2023-08-18 ENCOUNTER — OUTPATIENT (OUTPATIENT)
Dept: OUTPATIENT SERVICES | Facility: HOSPITAL | Age: 63
LOS: 1 days | End: 2023-08-18
Payer: MEDICAID

## 2023-08-18 DIAGNOSIS — Z00.00 ENCOUNTER FOR GENERAL ADULT MEDICAL EXAMINATION W/OUT ABNORMAL FINDINGS: ICD-10-CM

## 2023-08-18 DIAGNOSIS — Z23 ENCOUNTER FOR IMMUNIZATION: ICD-10-CM

## 2023-08-18 PROCEDURE — 90471 IMMUNIZATION ADMIN: CPT

## 2023-08-18 PROCEDURE — 96372 THER/PROPH/DIAG INJ SC/IM: CPT

## 2023-08-23 ENCOUNTER — APPOINTMENT (OUTPATIENT)
Dept: INTERNAL MEDICINE | Facility: CLINIC | Age: 63
End: 2023-08-23

## 2023-08-30 DIAGNOSIS — Z23 ENCOUNTER FOR IMMUNIZATION: ICD-10-CM

## 2023-09-21 ENCOUNTER — NON-APPOINTMENT (OUTPATIENT)
Age: 63
End: 2023-09-21

## 2023-09-22 ENCOUNTER — APPOINTMENT (OUTPATIENT)
Dept: GASTROENTEROLOGY | Facility: CLINIC | Age: 63
End: 2023-09-22
Payer: MEDICAID

## 2023-09-22 ENCOUNTER — OUTPATIENT (OUTPATIENT)
Dept: OUTPATIENT SERVICES | Facility: HOSPITAL | Age: 63
LOS: 1 days | End: 2023-09-22
Payer: MEDICAID

## 2023-09-22 VITALS
WEIGHT: 236 LBS | DIASTOLIC BLOOD PRESSURE: 83 MMHG | BODY MASS INDEX: 37.93 KG/M2 | OXYGEN SATURATION: 99 % | HEART RATE: 68 BPM | HEIGHT: 66 IN | SYSTOLIC BLOOD PRESSURE: 130 MMHG

## 2023-09-22 DIAGNOSIS — R19.7 DIARRHEA, UNSPECIFIED: ICD-10-CM

## 2023-09-22 DIAGNOSIS — Z00.00 ENCOUNTER FOR GENERAL ADULT MEDICAL EXAMINATION WITHOUT ABNORMAL FINDINGS: ICD-10-CM

## 2023-09-22 DIAGNOSIS — Z12.11 ENCOUNTER FOR SCREENING FOR MALIGNANT NEOPLASM OF COLON: ICD-10-CM

## 2023-09-22 DIAGNOSIS — D53.9 NUTRITIONAL ANEMIA, UNSPECIFIED: ICD-10-CM

## 2023-09-22 DIAGNOSIS — Z85.038 ENCOUNTER FOR SCREENING FOR MALIGNANT NEOPLASM OF COLON: ICD-10-CM

## 2023-09-22 PROCEDURE — 83540 ASSAY OF IRON: CPT

## 2023-09-22 PROCEDURE — 82746 ASSAY OF FOLIC ACID SERUM: CPT

## 2023-09-22 PROCEDURE — 82728 ASSAY OF FERRITIN: CPT

## 2023-09-22 PROCEDURE — 83550 IRON BINDING TEST: CPT

## 2023-09-22 PROCEDURE — 99213 OFFICE O/P EST LOW 20 MIN: CPT | Mod: GC

## 2023-09-22 PROCEDURE — 36415 COLL VENOUS BLD VENIPUNCTURE: CPT

## 2023-09-22 PROCEDURE — 84466 ASSAY OF TRANSFERRIN: CPT

## 2023-09-22 PROCEDURE — 99214 OFFICE O/P EST MOD 30 MIN: CPT

## 2023-09-22 PROCEDURE — 82607 VITAMIN B-12: CPT

## 2023-09-26 ENCOUNTER — NON-APPOINTMENT (OUTPATIENT)
Age: 63
End: 2023-09-26

## 2023-09-30 LAB
FERRITIN SERPL-MCNC: 51 NG/ML
FOLATE SERPL-MCNC: 6.3 NG/ML
IRON SATN MFR SERPL: 28 %
IRON SERPL-MCNC: 106 UG/DL
TIBC SERPL-MCNC: 374 UG/DL
TRANSFERRIN SERPL-MCNC: 303 MG/DL
UIBC SERPL-MCNC: 268 UG/DL
VIT B12 SERPL-MCNC: 251 PG/ML

## 2023-10-06 ENCOUNTER — OUTPATIENT (OUTPATIENT)
Dept: OUTPATIENT SERVICES | Facility: HOSPITAL | Age: 63
LOS: 1 days | End: 2023-10-06
Payer: MEDICAID

## 2023-10-06 ENCOUNTER — APPOINTMENT (OUTPATIENT)
Dept: PODIATRY | Facility: CLINIC | Age: 63
End: 2023-10-06
Payer: MEDICAID

## 2023-10-06 DIAGNOSIS — Z00.00 ENCOUNTER FOR GENERAL ADULT MEDICAL EXAMINATION WITHOUT ABNORMAL FINDINGS: ICD-10-CM

## 2023-10-06 PROCEDURE — 11721 DEBRIDE NAIL 6 OR MORE: CPT | Mod: NC

## 2023-10-06 PROCEDURE — 11721 DEBRIDE NAIL 6 OR MORE: CPT
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
## 2023-10-11 DIAGNOSIS — E11.42 TYPE 2 DIABETES MELLITUS WITH DIABETIC POLYNEUROPATHY: ICD-10-CM

## 2023-10-11 DIAGNOSIS — L60.3 NAIL DYSTROPHY: ICD-10-CM

## 2023-10-23 ENCOUNTER — APPOINTMENT (OUTPATIENT)
Dept: INTERNAL MEDICINE | Facility: CLINIC | Age: 63
End: 2023-10-23
Payer: MEDICAID

## 2023-10-23 ENCOUNTER — OUTPATIENT (OUTPATIENT)
Dept: OUTPATIENT SERVICES | Facility: HOSPITAL | Age: 63
LOS: 1 days | End: 2023-10-23
Payer: MEDICAID

## 2023-10-23 VITALS
SYSTOLIC BLOOD PRESSURE: 111 MMHG | DIASTOLIC BLOOD PRESSURE: 73 MMHG | HEART RATE: 73 BPM | OXYGEN SATURATION: 98 % | HEIGHT: 66 IN | TEMPERATURE: 98 F | BODY MASS INDEX: 38.25 KG/M2 | WEIGHT: 238 LBS

## 2023-10-23 DIAGNOSIS — H61.20 IMPACTED CERUMEN, UNSPECIFIED EAR: ICD-10-CM

## 2023-10-23 DIAGNOSIS — M25.562 PAIN IN LEFT KNEE: ICD-10-CM

## 2023-10-23 DIAGNOSIS — Z00.00 ENCOUNTER FOR GENERAL ADULT MEDICAL EXAMINATION WITHOUT ABNORMAL FINDINGS: ICD-10-CM

## 2023-10-23 DIAGNOSIS — G89.29 PAIN IN LEFT KNEE: ICD-10-CM

## 2023-10-23 PROCEDURE — ZZZZZ: CPT

## 2023-10-23 PROCEDURE — 99214 OFFICE O/P EST MOD 30 MIN: CPT

## 2023-10-23 PROCEDURE — 90686 IIV4 VACC NO PRSV 0.5 ML IM: CPT

## 2023-10-23 PROCEDURE — 86780 TREPONEMA PALLIDUM: CPT

## 2023-10-23 PROCEDURE — 90471 IMMUNIZATION ADMIN: CPT | Mod: 25

## 2023-10-23 RX ORDER — DICLOFENAC SODIUM 1% 10 MG/G
1 GEL TOPICAL DAILY
Qty: 3 | Refills: 3 | Status: ACTIVE | COMMUNITY
Start: 2023-10-23 | End: 1900-01-01

## 2023-10-23 RX ORDER — LIDOCAINE 40 MG/G
4 PATCH TOPICAL
Qty: 30 | Refills: 3 | Status: ACTIVE | COMMUNITY
Start: 2023-10-23 | End: 1900-01-01

## 2023-10-24 LAB — T PALLIDUM AB SER QL IA: NEGATIVE

## 2023-11-06 DIAGNOSIS — R68.89 OTHER GENERAL SYMPTOMS AND SIGNS: ICD-10-CM

## 2023-11-06 DIAGNOSIS — M25.562 PAIN IN LEFT KNEE: ICD-10-CM

## 2023-11-06 DIAGNOSIS — H61.20 IMPACTED CERUMEN, UNSPECIFIED EAR: ICD-10-CM

## 2023-11-06 DIAGNOSIS — Z23 ENCOUNTER FOR IMMUNIZATION: ICD-10-CM

## 2023-11-14 ENCOUNTER — OUTPATIENT (OUTPATIENT)
Dept: OUTPATIENT SERVICES | Facility: HOSPITAL | Age: 63
LOS: 1 days | End: 2023-11-14
Payer: MEDICAID

## 2023-11-14 ENCOUNTER — APPOINTMENT (OUTPATIENT)
Dept: NEUROLOGY | Facility: CLINIC | Age: 63
End: 2023-11-14
Payer: MEDICAID

## 2023-11-14 VITALS — SYSTOLIC BLOOD PRESSURE: 167 MMHG | DIASTOLIC BLOOD PRESSURE: 94 MMHG | HEART RATE: 67 BPM | OXYGEN SATURATION: 98 %

## 2023-11-14 DIAGNOSIS — Z00.00 ENCOUNTER FOR GENERAL ADULT MEDICAL EXAMINATION WITHOUT ABNORMAL FINDINGS: ICD-10-CM

## 2023-11-14 PROCEDURE — 99214 OFFICE O/P EST MOD 30 MIN: CPT

## 2023-11-15 ENCOUNTER — OUTPATIENT (OUTPATIENT)
Dept: OUTPATIENT SERVICES | Facility: HOSPITAL | Age: 63
LOS: 1 days | End: 2023-11-15
Payer: MEDICAID

## 2023-11-15 ENCOUNTER — APPOINTMENT (OUTPATIENT)
Dept: OPHTHALMOLOGY | Facility: CLINIC | Age: 63
End: 2023-11-15
Payer: MEDICAID

## 2023-11-15 DIAGNOSIS — H53.8 OTHER VISUAL DISTURBANCES: ICD-10-CM

## 2023-11-15 DIAGNOSIS — R68.89 OTHER GENERAL SYMPTOMS AND SIGNS: ICD-10-CM

## 2023-11-15 PROCEDURE — 92012 INTRM OPH EXAM EST PATIENT: CPT

## 2023-11-27 ENCOUNTER — APPOINTMENT (OUTPATIENT)
Dept: OTOLARYNGOLOGY | Facility: CLINIC | Age: 63
End: 2023-11-27
Payer: MEDICAID

## 2023-11-27 DIAGNOSIS — H61.23 IMPACTED CERUMEN, BILATERAL: ICD-10-CM

## 2023-11-27 DIAGNOSIS — H93.8X3 OTHER SPECIFIED DISORDERS OF EAR, BILATERAL: ICD-10-CM

## 2023-11-27 PROCEDURE — 69210 REMOVE IMPACTED EAR WAX UNI: CPT

## 2023-12-06 ENCOUNTER — OUTPATIENT (OUTPATIENT)
Dept: OUTPATIENT SERVICES | Facility: HOSPITAL | Age: 63
LOS: 1 days | End: 2023-12-06
Payer: MEDICAID

## 2023-12-06 ENCOUNTER — APPOINTMENT (OUTPATIENT)
Dept: OPHTHALMOLOGY | Facility: CLINIC | Age: 63
End: 2023-12-06
Payer: MEDICAID

## 2023-12-06 DIAGNOSIS — H34.8322 TRIBUTARY (BRANCH) RETINAL VEIN OCCLUSION, LEFT EYE, STABLE: ICD-10-CM

## 2023-12-06 DIAGNOSIS — H35.039 HYPERTENSIVE RETINOPATHY, UNSPECIFIED EYE: ICD-10-CM

## 2023-12-06 DIAGNOSIS — H40.52X0 GLAUCOMA SECONDARY TO OTHER EYE DISORDERS, LEFT EYE, STAGE UNSPECIFIED: ICD-10-CM

## 2023-12-06 DIAGNOSIS — H35.81 RETINAL EDEMA: ICD-10-CM

## 2023-12-06 DIAGNOSIS — H35.89 OTHER SPECIFIED RETINAL DISORDERS: ICD-10-CM

## 2023-12-06 DIAGNOSIS — H53.8 OTHER VISUAL DISTURBANCES: ICD-10-CM

## 2023-12-06 PROCEDURE — 67228 TREATMENT X10SV RETINOPATHY: CPT | Mod: LT

## 2023-12-06 PROCEDURE — 92012 INTRM OPH EXAM EST PATIENT: CPT | Mod: 57

## 2023-12-06 PROCEDURE — 92012 INTRM OPH EXAM EST PATIENT: CPT

## 2023-12-27 ENCOUNTER — NON-APPOINTMENT (OUTPATIENT)
Age: 63
End: 2023-12-27

## 2024-01-03 ENCOUNTER — APPOINTMENT (OUTPATIENT)
Dept: OPHTHALMOLOGY | Facility: CLINIC | Age: 64
End: 2024-01-03
Payer: MEDICAID

## 2024-01-03 ENCOUNTER — OUTPATIENT (OUTPATIENT)
Dept: OUTPATIENT SERVICES | Facility: HOSPITAL | Age: 64
LOS: 1 days | End: 2024-01-03
Payer: MEDICAID

## 2024-01-03 DIAGNOSIS — H53.8 OTHER VISUAL DISTURBANCES: ICD-10-CM

## 2024-01-03 PROCEDURE — 92012 INTRM OPH EXAM EST PATIENT: CPT

## 2024-01-03 PROCEDURE — 92012 INTRM OPH EXAM EST PATIENT: CPT | Mod: 25

## 2024-01-03 PROCEDURE — 67028 INJECTION EYE DRUG: CPT | Mod: LT

## 2024-01-03 PROCEDURE — 92134 CPTRZ OPH DX IMG PST SGM RTA: CPT | Mod: 26

## 2024-01-03 PROCEDURE — 92134 CPTRZ OPH DX IMG PST SGM RTA: CPT

## 2024-01-05 ENCOUNTER — NON-APPOINTMENT (OUTPATIENT)
Age: 64
End: 2024-01-05

## 2024-01-09 DIAGNOSIS — H35.81 RETINAL EDEMA: ICD-10-CM

## 2024-01-09 DIAGNOSIS — H35.039 HYPERTENSIVE RETINOPATHY, UNSPECIFIED EYE: ICD-10-CM

## 2024-01-09 DIAGNOSIS — H35.89 OTHER SPECIFIED RETINAL DISORDERS: ICD-10-CM

## 2024-01-09 DIAGNOSIS — H40.52X0 GLAUCOMA SECONDARY TO OTHER EYE DISORDERS, LEFT EYE, STAGE UNSPECIFIED: ICD-10-CM

## 2024-01-09 DIAGNOSIS — H34.8392 TRIBUTARY (BRANCH) RETINAL VEIN OCCLUSION, UNSPECIFIED EYE, STABLE: ICD-10-CM

## 2024-01-11 ENCOUNTER — TRANSCRIPTION ENCOUNTER (OUTPATIENT)
Age: 64
End: 2024-01-11

## 2024-01-11 ENCOUNTER — RESULT REVIEW (OUTPATIENT)
Age: 64
End: 2024-01-11

## 2024-01-11 ENCOUNTER — OUTPATIENT (OUTPATIENT)
Dept: OUTPATIENT SERVICES | Facility: HOSPITAL | Age: 64
LOS: 1 days | Discharge: HOME | End: 2024-01-11
Payer: MEDICAID

## 2024-01-11 VITALS
RESPIRATION RATE: 18 BRPM | SYSTOLIC BLOOD PRESSURE: 128 MMHG | HEART RATE: 63 BPM | DIASTOLIC BLOOD PRESSURE: 73 MMHG | OXYGEN SATURATION: 100 %

## 2024-01-11 VITALS
HEIGHT: 66 IN | RESPIRATION RATE: 18 BRPM | DIASTOLIC BLOOD PRESSURE: 62 MMHG | HEART RATE: 70 BPM | TEMPERATURE: 98 F | SYSTOLIC BLOOD PRESSURE: 118 MMHG | WEIGHT: 229.94 LBS

## 2024-01-11 DIAGNOSIS — Z90.49 ACQUIRED ABSENCE OF OTHER SPECIFIED PARTS OF DIGESTIVE TRACT: Chronic | ICD-10-CM

## 2024-01-11 DIAGNOSIS — Z12.11 ENCOUNTER FOR SCREENING FOR MALIGNANT NEOPLASM OF COLON: ICD-10-CM

## 2024-01-11 PROCEDURE — C1889: CPT

## 2024-01-11 PROCEDURE — 88305 TISSUE EXAM BY PATHOLOGIST: CPT

## 2024-01-11 PROCEDURE — 45380 COLONOSCOPY AND BIOPSY: CPT | Mod: 59

## 2024-01-11 PROCEDURE — 88305 TISSUE EXAM BY PATHOLOGIST: CPT | Mod: 26

## 2024-01-11 PROCEDURE — 45385 COLONOSCOPY W/LESION REMOVAL: CPT

## 2024-01-11 NOTE — ASU DISCHARGE PLAN (ADULT/PEDIATRIC) - NS MD DC FALL RISK RISK
For information on Fall & Injury Prevention, visit: [email or web address] OR  [email or web address] OR  https://www.cdc.gov/steadi/patient.html For information on Fall & Injury Prevention, visit: [email or web address] OR  [email or web address] OR  https://www.cdc.gov/steadi/patient.html

## 2024-01-11 NOTE — ASU PATIENT PROFILE, ADULT - FALL HARM RISK - UNIVERSAL INTERVENTIONS
Bed in lowest position, wheels locked, appropriate side rails in place/Call bell, personal items and telephone in reach/Instruct patient to call for assistance before getting out of bed or chair/Non-slip footwear when patient is out of bed/[place] to call system/Physically safe environment - no spills, clutter or unnecessary equipment/Purposeful Proactive Rounding/Room/bathroom lighting operational, light cord in reach Bed in lowest position, wheels locked, appropriate side rails in place/Call bell, personal items and telephone in reach/Instruct patient to call for assistance before getting out of bed or chair/Non-slip footwear when patient is out of bed/[place] to call system/Physically safe environment - no spills, clutter or unnecessary equipment/Purposeful Proactive Rounding/Room/bathroom lighting operational, light cord in reach

## 2024-01-16 DIAGNOSIS — Z85.038 PERSONAL HISTORY OF OTHER MALIGNANT NEOPLASM OF LARGE INTESTINE: ICD-10-CM

## 2024-01-16 DIAGNOSIS — D12.7 BENIGN NEOPLASM OF RECTOSIGMOID JUNCTION: ICD-10-CM

## 2024-01-16 DIAGNOSIS — Z90.49 ACQUIRED ABSENCE OF OTHER SPECIFIED PARTS OF DIGESTIVE TRACT: ICD-10-CM

## 2024-01-16 DIAGNOSIS — Z12.11 ENCOUNTER FOR SCREENING FOR MALIGNANT NEOPLASM OF COLON: ICD-10-CM

## 2024-01-16 DIAGNOSIS — K91.89 OTHER POSTPROCEDURAL COMPLICATIONS AND DISORDERS OF DIGESTIVE SYSTEM: ICD-10-CM

## 2024-01-19 ENCOUNTER — OUTPATIENT (OUTPATIENT)
Dept: OUTPATIENT SERVICES | Facility: HOSPITAL | Age: 64
LOS: 1 days | End: 2024-01-19
Payer: MEDICAID

## 2024-01-19 ENCOUNTER — APPOINTMENT (OUTPATIENT)
Dept: INTERNAL MEDICINE | Facility: CLINIC | Age: 64
End: 2024-01-19

## 2024-01-19 DIAGNOSIS — Z90.49 ACQUIRED ABSENCE OF OTHER SPECIFIED PARTS OF DIGESTIVE TRACT: Chronic | ICD-10-CM

## 2024-01-19 DIAGNOSIS — Z00.00 ENCOUNTER FOR GENERAL ADULT MEDICAL EXAMINATION WITHOUT ABNORMAL FINDINGS: ICD-10-CM

## 2024-01-19 PROCEDURE — 90471 IMMUNIZATION ADMIN: CPT

## 2024-01-19 PROCEDURE — 90746 HEPB VACCINE 3 DOSE ADULT IM: CPT

## 2024-01-19 PROCEDURE — 96372 THER/PROPH/DIAG INJ SC/IM: CPT

## 2024-01-23 DIAGNOSIS — Z23 ENCOUNTER FOR IMMUNIZATION: ICD-10-CM

## 2024-01-24 ENCOUNTER — OUTPATIENT (OUTPATIENT)
Dept: OUTPATIENT SERVICES | Facility: HOSPITAL | Age: 64
LOS: 1 days | End: 2024-01-24
Payer: MEDICAID

## 2024-01-24 ENCOUNTER — RESULT REVIEW (OUTPATIENT)
Age: 64
End: 2024-01-24

## 2024-01-24 DIAGNOSIS — Z90.49 ACQUIRED ABSENCE OF OTHER SPECIFIED PARTS OF DIGESTIVE TRACT: Chronic | ICD-10-CM

## 2024-01-24 DIAGNOSIS — Z12.31 ENCOUNTER FOR SCREENING MAMMOGRAM FOR MALIGNANT NEOPLASM OF BREAST: ICD-10-CM

## 2024-01-24 PROCEDURE — 77063 BREAST TOMOSYNTHESIS BI: CPT | Mod: 26

## 2024-01-24 PROCEDURE — 77067 SCR MAMMO BI INCL CAD: CPT | Mod: 26

## 2024-01-24 PROCEDURE — 77067 SCR MAMMO BI INCL CAD: CPT

## 2024-01-24 PROCEDURE — 77063 BREAST TOMOSYNTHESIS BI: CPT

## 2024-01-25 DIAGNOSIS — Z12.31 ENCOUNTER FOR SCREENING MAMMOGRAM FOR MALIGNANT NEOPLASM OF BREAST: ICD-10-CM

## 2024-01-26 ENCOUNTER — APPOINTMENT (OUTPATIENT)
Dept: PODIATRY | Facility: CLINIC | Age: 64
End: 2024-01-26
Payer: MEDICAID

## 2024-01-26 ENCOUNTER — OUTPATIENT (OUTPATIENT)
Dept: OUTPATIENT SERVICES | Facility: HOSPITAL | Age: 64
LOS: 1 days | End: 2024-01-26
Payer: MEDICAID

## 2024-01-26 DIAGNOSIS — Z00.00 ENCOUNTER FOR GENERAL ADULT MEDICAL EXAMINATION WITHOUT ABNORMAL FINDINGS: ICD-10-CM

## 2024-01-26 DIAGNOSIS — Z90.49 ACQUIRED ABSENCE OF OTHER SPECIFIED PARTS OF DIGESTIVE TRACT: Chronic | ICD-10-CM

## 2024-01-26 DIAGNOSIS — M79.675 PAIN IN RIGHT TOE(S): ICD-10-CM

## 2024-01-26 DIAGNOSIS — M79.674 PAIN IN RIGHT TOE(S): ICD-10-CM

## 2024-01-26 DIAGNOSIS — L60.3 NAIL DYSTROPHY: ICD-10-CM

## 2024-01-26 PROBLEM — C18.9 MALIGNANT NEOPLASM OF COLON, UNSPECIFIED: Chronic | Status: ACTIVE | Noted: 2024-01-11

## 2024-01-26 PROCEDURE — 11721 DEBRIDE NAIL 6 OR MORE: CPT

## 2024-01-29 ENCOUNTER — APPOINTMENT (OUTPATIENT)
Dept: INTERNAL MEDICINE | Facility: CLINIC | Age: 64
End: 2024-01-29
Payer: MEDICAID

## 2024-01-29 ENCOUNTER — OUTPATIENT (OUTPATIENT)
Dept: OUTPATIENT SERVICES | Facility: HOSPITAL | Age: 64
LOS: 1 days | End: 2024-01-29
Payer: MEDICAID

## 2024-01-29 VITALS — DIASTOLIC BLOOD PRESSURE: 83 MMHG | HEART RATE: 71 BPM | SYSTOLIC BLOOD PRESSURE: 133 MMHG

## 2024-01-29 VITALS
SYSTOLIC BLOOD PRESSURE: 143 MMHG | DIASTOLIC BLOOD PRESSURE: 82 MMHG | HEIGHT: 66 IN | OXYGEN SATURATION: 100 % | TEMPERATURE: 98 F | RESPIRATION RATE: 20 BRPM | HEART RATE: 75 BPM | BODY MASS INDEX: 37.66 KG/M2 | WEIGHT: 234.3 LBS

## 2024-01-29 DIAGNOSIS — M25.561 PAIN IN RIGHT KNEE: ICD-10-CM

## 2024-01-29 DIAGNOSIS — Z90.49 ACQUIRED ABSENCE OF OTHER SPECIFIED PARTS OF DIGESTIVE TRACT: Chronic | ICD-10-CM

## 2024-01-29 DIAGNOSIS — R68.89 OTHER GENERAL SYMPTOMS AND SIGNS: ICD-10-CM

## 2024-01-29 DIAGNOSIS — E66.9 OBESITY, UNSPECIFIED: ICD-10-CM

## 2024-01-29 DIAGNOSIS — Z85.038 PERSONAL HISTORY OF OTHER MALIGNANT NEOPLASM OF LARGE INTESTINE: ICD-10-CM

## 2024-01-29 DIAGNOSIS — Z00.00 ENCOUNTER FOR GENERAL ADULT MEDICAL EXAMINATION WITHOUT ABNORMAL FINDINGS: ICD-10-CM

## 2024-01-29 DIAGNOSIS — R42 DIZZINESS AND GIDDINESS: ICD-10-CM

## 2024-01-29 PROCEDURE — 99214 OFFICE O/P EST MOD 30 MIN: CPT

## 2024-01-29 PROCEDURE — G2211 COMPLEX E/M VISIT ADD ON: CPT | Mod: NC,1L

## 2024-01-29 RX ORDER — SILVER SULFADIAZINE 10 MG/G
1 CREAM TOPICAL DAILY
Qty: 1 | Refills: 3 | Status: DISCONTINUED | COMMUNITY
Start: 2022-01-03 | End: 2024-01-29

## 2024-01-29 RX ORDER — POLYETHYLENE GLYCOL 3350 AND ELECTROLYTES WITH LEMON FLAVOR 236; 22.74; 6.74; 5.86; 2.97 G/4L; G/4L; G/4L; G/4L; G/4L
236 POWDER, FOR SOLUTION ORAL
Qty: 1 | Refills: 0 | Status: DISCONTINUED | COMMUNITY
Start: 2023-09-22 | End: 2024-01-29

## 2024-01-29 RX ORDER — POLYETHYLENE GLYCOL 3350 AND ELECTROLYTES WITH LEMON FLAVOR 236; 22.74; 6.74; 5.86; 2.97 G/4L; G/4L; G/4L; G/4L; G/4L
236 POWDER, FOR SOLUTION ORAL
Qty: 1 | Refills: 0 | Status: DISCONTINUED | COMMUNITY
Start: 2022-11-02 | End: 2024-01-29

## 2024-01-29 RX ORDER — MAGNESIUM 200 MG
1000 TABLET ORAL
Qty: 90 | Refills: 2 | Status: DISCONTINUED | COMMUNITY
Start: 2023-11-14 | End: 2024-01-29

## 2024-01-29 RX ORDER — ASPIRIN 81 MG
6.5 TABLET, DELAYED RELEASE (ENTERIC COATED) ORAL
Qty: 1 | Refills: 0 | Status: DISCONTINUED | COMMUNITY
Start: 2022-12-19 | End: 2024-01-29

## 2024-01-29 RX ORDER — BISACODYL 5 MG/1
9000 TABLET, COATED ORAL
Qty: 90 | Refills: 0 | Status: DISCONTINUED | COMMUNITY
Start: 2023-09-22 | End: 2024-01-29

## 2024-01-29 RX ORDER — CARBAMIDE PEROXIDE 65 MG/ML
6.5 LIQUID TOPICAL
Qty: 1 | Refills: 0 | Status: DISCONTINUED | COMMUNITY
Start: 2022-03-30 | End: 2024-01-29

## 2024-01-29 NOTE — PHYSICAL EXAM
[No Acute Distress] : no acute distress [Normal Sclera/Conjunctiva] : normal sclera/conjunctiva [Normal Outer Ear/Nose] : the outer ears and nose were normal in appearance [No JVD] : no jugular venous distention [No Respiratory Distress] : no respiratory distress  [No Accessory Muscle Use] : no accessory muscle use [Clear to Auscultation] : lungs were clear to auscultation bilaterally [Normal Rate] : normal rate  [Regular Rhythm] : with a regular rhythm [Normal S1, S2] : normal S1 and S2 [No Murmur] : no murmur heard [No Edema] : there was no peripheral edema [Soft] : abdomen soft [Non Tender] : non-tender [Non-distended] : non-distended [No CVA Tenderness] : no CVA  tenderness [No Joint Swelling] : no joint swelling [No Rash] : no rash [Coordination Grossly Intact] : coordination grossly intact [No Focal Deficits] : no focal deficits [Normal Affect] : the affect was normal [Normal Insight/Judgement] : insight and judgment were intact [de-identified] : overweight [de-identified] : knee crepitus

## 2024-01-29 NOTE — REVIEW OF SYSTEMS
[Diarrhea] : diarrhea [Joint Pain] : joint pain [Dizziness] : dizziness [Fever] : no fever [Vision Problems] : no vision problems [Earache] : no earache [Sore Throat] : no sore throat [Chest Pain] : no chest pain [Palpitations] : no palpitations [Shortness Of Breath] : no shortness of breath [Cough] : no cough [Abdominal Pain] : no abdominal pain [Dysuria] : no dysuria [Headache] : no headache [Anxiety] : no anxiety [Depression] : no depression [FreeTextEntry9] : R knee > L knee pain

## 2024-01-29 NOTE — ASSESSMENT
[FreeTextEntry1] : Patient is a 62 y/o F with osteoarthritis, hx of colon cancer (in late 30s) s/p resection. She presents today for follow up and discusses knee pain and dizziness.  #Knee pain- secondary to osteoarthritis - Describes R knee > L knee today, improves with rest and 2 x Aleve which she takes occasionally. - Xray R knee May 2023 shows OA - Continue current management - Advised on weight loss - Encouraged on physical therapy (referral given Oct 2023)  #Dizziness, likely orthostatic - Patient describes she feels imbalanced first upon getting up - Explained in detail regarding getting up and changing positions slowly from laying to sitting to standing. - Neuro exam grossly intact - Has neuro appt in Feb 2024 discussed orthostatic precautions feels better s/p b/l ear cleaning c ENT no falls  #Pre-DM #HLD - Counseled on weight loss and better diet control, no meds for now. Hyperlipidemia; Low fat, low cholesterol diet. Discussed importance of eating a heart healthy diet Counseled on aerobic exercise and weight loss Fasting lipid panel every 3 months Treatment options and possible side effects discussed  Smoking cessation discussed, if applicable Patient counseled on effects of ETOH on lipid levels stable  Obesity; Diet/Exercise Counseled Patient was counseled on changing their diet to low fat, low carbohydrates and low cholesterol. Patient was also counseled on increasing their activity to 45 minutes of exercise daily.  #HCM - Colonoscopy Jan 2024, needs to be repeated in 1 year due to inadequate prep Colon Cancer Screening; Patient counseled on the importance of colonoscopy screening and directed to follow-up with GI doctor. Failure to perform test can result in Colon Cancer and Death. - B/l mammogram Jan 2024 BI-RADS 1 - RTC 6 months.

## 2024-01-29 NOTE — HISTORY OF PRESENT ILLNESS
[Other: _____] : [unfilled] [FreeTextEntry1] : Follow up [de-identified] : Patient is a 62 y/o F with osteoarthritis, hx of colon cancer (in late 30s) s/p resection. She presents today for follow up and discusses knee pain which is in her R knee > L knee. It comes and goes and is relived with Aleve and rest. Other than this, discussed the results of colonoscopy and mammogram. She also endorses dizziness x 1 month. Patient feels like she will tip over when she first stands up. When she sits down, the dizziness goes away. No headaches, vision changes, nausea, palpitations.
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
## 2024-01-30 DIAGNOSIS — L60.3 NAIL DYSTROPHY: ICD-10-CM
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
## 2024-01-30 DIAGNOSIS — M79.674 PAIN IN RIGHT TOE(S): ICD-10-CM

## 2024-01-30 NOTE — PHYSICAL EXAM
[Ankle Swelling (On Exam)] : present [Ankle Swelling Bilaterally] : bilaterally  [Varicose Veins Of Lower Extremities] : bilaterally [Ankle Swelling On The Right] : mild [2+] : left foot dorsalis pedis 2+ [] : normal strength/tone [Sensation] : the sensory exam was normal to light touch and pinprick [Oriented To Time, Place, And Person] : oriented to person, place, and time [Impaired Insight] : insight and judgment were intact [General Appearance - Alert] : alert [General Appearance - In No Acute Distress] : in no acute distress [Delayed in the Right Toes] : capillary refills normal in right toes [Delayed in the Left Toes] : capillary refills normal in the left toes [de-identified] : right 1st mpj limited ROM, no pain w/ ROM of 1st mpj [Foot Ulcer] : no foot ulcer [FreeTextEntry1] : plantar foot calluses b/l Nails elongated, discolored with subungual debris x 10 [Vibration Dec.] : normal vibratory sensation at the level of the toes [Diminished Throughout Right Foot] : normal sensation with monofilament testing throughout right foot [Diminished Throughout Left Foot] : normal sensation with monofilament testing throughout left foot

## 2024-01-30 NOTE — PHYSICAL EXAM
[Ankle Swelling Bilaterally] : bilaterally  [Ankle Swelling (On Exam)] : present [Varicose Veins Of Lower Extremities] : bilaterally [Ankle Swelling On The Right] : mild [2+] : left foot dorsalis pedis 2+ [] : normal strength/tone [Sensation] : the sensory exam was normal to light touch and pinprick [Oriented To Time, Place, And Person] : oriented to person, place, and time [Impaired Insight] : insight and judgment were intact [General Appearance - Alert] : alert [General Appearance - In No Acute Distress] : in no acute distress [Delayed in the Right Toes] : capillary refills normal in right toes [Delayed in the Left Toes] : capillary refills normal in the left toes [de-identified] : right 1st mpj limited ROM, no pain w/ ROM of 1st mpj [Foot Ulcer] : no foot ulcer [FreeTextEntry1] : plantar foot calluses b/l Nails elongated, discolored with subungual debris x 10 [Vibration Dec.] : normal vibratory sensation at the level of the toes [Diminished Throughout Right Foot] : normal sensation with monofilament testing throughout right foot [Diminished Throughout Left Foot] : normal sensation with monofilament testing throughout left foot

## 2024-01-30 NOTE — HISTORY OF PRESENT ILLNESS
[FreeTextEntry1] : new patient is pre-diabetic here for diabetic foot evaluation upon referral from primary. Her only complaint is elongated nails which sometimes catch on her socks and shoes. no other acute problems at this time.   10/6 here today for management of nail dystrophy. Last DM eval 7/2023

## 2024-01-30 NOTE — ASSESSMENT
[FreeTextEntry1] : -Aseptic debridement of all fungal nails.  Patient has pain about the toes secondary to nail pressure and relates improvement with periodic debridement. -return 3 month

## 2024-02-01 DIAGNOSIS — M25.561 PAIN IN RIGHT KNEE: ICD-10-CM

## 2024-02-01 DIAGNOSIS — R68.89 OTHER GENERAL SYMPTOMS AND SIGNS: ICD-10-CM

## 2024-02-01 DIAGNOSIS — Z85.038 PERSONAL HISTORY OF OTHER MALIGNANT NEOPLASM OF LARGE INTESTINE: ICD-10-CM

## 2024-02-01 DIAGNOSIS — R42 DIZZINESS AND GIDDINESS: ICD-10-CM

## 2024-02-01 DIAGNOSIS — E66.9 OBESITY, UNSPECIFIED: ICD-10-CM

## 2024-02-23 ENCOUNTER — RESULT REVIEW (OUTPATIENT)
Age: 64
End: 2024-02-23

## 2024-02-23 ENCOUNTER — OUTPATIENT (OUTPATIENT)
Dept: OUTPATIENT SERVICES | Facility: HOSPITAL | Age: 64
LOS: 1 days | End: 2024-02-23
Payer: MEDICAID

## 2024-02-23 DIAGNOSIS — Z90.49 ACQUIRED ABSENCE OF OTHER SPECIFIED PARTS OF DIGESTIVE TRACT: Chronic | ICD-10-CM

## 2024-02-23 DIAGNOSIS — R68.89 OTHER GENERAL SYMPTOMS AND SIGNS: ICD-10-CM

## 2024-02-23 DIAGNOSIS — Z00.8 ENCOUNTER FOR OTHER GENERAL EXAMINATION: ICD-10-CM

## 2024-02-23 PROCEDURE — 70551 MRI BRAIN STEM W/O DYE: CPT | Mod: 26

## 2024-02-23 PROCEDURE — 70551 MRI BRAIN STEM W/O DYE: CPT

## 2024-02-23 NOTE — END OF VISIT
[FreeTextEntry3] : Memory continues to gradually decline She did not have brain imaging ordered at last visit - will reorder MRI brain B12 borderline low - recommend she start 1000mg daily and recheck at next visit  Consider donepezil or memantine in the future pending MRI results and effect of B12 f/u in 3 months

## 2024-02-23 NOTE — PHYSICAL EXAM
[FreeTextEntry1] : Mental status: Awake, alert and oriented x2 (Place and person).  Naming, repetition and comprehension intact.  Attention/concentration intact.  No dysarthria, no aphasia.   Cranial nerves: Pupils equally round and reactive to light, visual fields full, no nystagmus, extraocular muscles intact, V1 through V3 intact bilaterally and symmetric, face symmetric, hearing intact to finger rub, palate elevation symmetric, tongue was midline. Motor:   Normal tone and bulk.  No abnormal movements.   Sensation: Intact to light touch,  Coordination: No dysmetria on finger-to-nose  Reflexes: 2+ in bilateral UE/ LE  MOCA 15/30

## 2024-02-23 NOTE — HISTORY OF PRESENT ILLNESS
[FreeTextEntry1] : Patient is a 62 y/o F with PMH borderline MR, pre-DM, osteoarthritis and colon cancer (s/p surgery and chemo/rad) who presents for follow up for memory impairment. Patient is accompanied by her sister.  Patient states that sometimes she is very forgetful but does not remember how long it has been going on for. Patient's sister mentions that the patient has been having memory loss for about a year were forgot to turn the stove off, cannot manage bills hence her sister manages the finances and never drives. Patient worked in the [employer] in the past until the pandemic. Patient also complains that she has been forgetting faces, names and dates since last 1 yr. She also states that sometimes patient gets agitated but most of the times she has good mood.  Education: completed high school  She denies any diplopia, blurry vision, LOC, fall, trouble walking, hallucination, sleeps most of the day without any RBD, has good appetite, has full bowel and bladder control.   Family history: mother: Alzheimer's ds sister: pancreatic cancer  father: heart ds  Reviewed: Labs below

## 2024-02-23 NOTE — ASSESSMENT
[FreeTextEntry1] : Patient is a 62 y/o F with PMH borderline MR, pre-DM, osteoarthritis and colon cancer (s/p surgery and chemo/rad) who presents for memory evaluation. Patient is accompanied by her sister. Patient's symptoms point towards early onset early-stage dementia, however, would like to rule out vascular or tumors as a cause.  Recs:  - MRI brain non con   - started on B12 sublingual tab  - RTC in 3months

## 2024-02-24 DIAGNOSIS — R68.89 OTHER GENERAL SYMPTOMS AND SIGNS: ICD-10-CM

## 2024-03-06 ENCOUNTER — APPOINTMENT (OUTPATIENT)
Dept: OPHTHALMOLOGY | Facility: CLINIC | Age: 64
End: 2024-03-06
Payer: MEDICAID

## 2024-03-06 ENCOUNTER — OUTPATIENT (OUTPATIENT)
Dept: OUTPATIENT SERVICES | Facility: HOSPITAL | Age: 64
LOS: 1 days | End: 2024-03-06
Payer: MEDICAID

## 2024-03-06 DIAGNOSIS — H53.8 OTHER VISUAL DISTURBANCES: ICD-10-CM

## 2024-03-06 DIAGNOSIS — H35.81 RETINAL EDEMA: ICD-10-CM

## 2024-03-06 DIAGNOSIS — H43.829 VITREOMACULAR ADHESION, UNSPECIFIED EYE: ICD-10-CM

## 2024-03-06 DIAGNOSIS — Z90.49 ACQUIRED ABSENCE OF OTHER SPECIFIED PARTS OF DIGESTIVE TRACT: Chronic | ICD-10-CM

## 2024-03-06 DIAGNOSIS — H40.52X0 GLAUCOMA SECONDARY TO OTHER EYE DISORDERS, LEFT EYE, STAGE UNSPECIFIED: ICD-10-CM

## 2024-03-06 DIAGNOSIS — H35.039 HYPERTENSIVE RETINOPATHY, UNSPECIFIED EYE: ICD-10-CM

## 2024-03-06 DIAGNOSIS — H35.89 OTHER SPECIFIED RETINAL DISORDERS: ICD-10-CM

## 2024-03-06 DIAGNOSIS — H34.8392 TRIBUTARY (BRANCH) RETINAL VEIN OCCLUSION, UNSPECIFIED EYE, STABLE: ICD-10-CM

## 2024-03-06 PROCEDURE — 92134 CPTRZ OPH DX IMG PST SGM RTA: CPT | Mod: 26

## 2024-03-06 PROCEDURE — 92012 INTRM OPH EXAM EST PATIENT: CPT | Mod: 25

## 2024-03-06 PROCEDURE — 92134 CPTRZ OPH DX IMG PST SGM RTA: CPT

## 2024-03-06 PROCEDURE — 67228 TREATMENT X10SV RETINOPATHY: CPT | Mod: LT

## 2024-03-06 PROCEDURE — 92012 INTRM OPH EXAM EST PATIENT: CPT

## 2024-03-14 RX ORDER — MECOBALAMIN 5000 MCG
5000 LOZENGE ORAL
Qty: 1 | Refills: 9 | Status: ACTIVE | COMMUNITY
Start: 2024-03-14 | End: 1900-01-01

## 2024-04-17 DIAGNOSIS — N64.9 DISORDER OF BREAST, UNSPECIFIED: ICD-10-CM

## 2024-04-23 ENCOUNTER — APPOINTMENT (OUTPATIENT)
Dept: NEUROLOGY | Facility: CLINIC | Age: 64
End: 2024-04-23
Payer: MEDICAID

## 2024-04-23 ENCOUNTER — OUTPATIENT (OUTPATIENT)
Dept: OUTPATIENT SERVICES | Facility: HOSPITAL | Age: 64
LOS: 1 days | End: 2024-04-23
Payer: MEDICAID

## 2024-04-23 VITALS
BODY MASS INDEX: 37.32 KG/M2 | OXYGEN SATURATION: 97 % | HEIGHT: 66 IN | TEMPERATURE: 97.6 F | DIASTOLIC BLOOD PRESSURE: 84 MMHG | WEIGHT: 232.19 LBS | HEART RATE: 59 BPM | SYSTOLIC BLOOD PRESSURE: 136 MMHG

## 2024-04-23 DIAGNOSIS — Z90.49 ACQUIRED ABSENCE OF OTHER SPECIFIED PARTS OF DIGESTIVE TRACT: Chronic | ICD-10-CM

## 2024-04-23 DIAGNOSIS — Z00.00 ENCOUNTER FOR GENERAL ADULT MEDICAL EXAMINATION WITHOUT ABNORMAL FINDINGS: ICD-10-CM

## 2024-04-23 DIAGNOSIS — R41.3 OTHER AMNESIA: ICD-10-CM

## 2024-04-23 PROCEDURE — G2211 COMPLEX E/M VISIT ADD ON: CPT | Mod: NC,1L

## 2024-04-23 PROCEDURE — 99213 OFFICE O/P EST LOW 20 MIN: CPT

## 2024-04-23 RX ORDER — DONEPEZIL HYDROCHLORIDE 5 MG/1
5 TABLET ORAL
Qty: 90 | Refills: 0 | Status: ACTIVE | COMMUNITY
Start: 2024-04-23 | End: 1900-01-01

## 2024-04-23 NOTE — END OF VISIT
[] : Resident [FreeTextEntry3] : Likely alzheimers dementia mild Sister providing good support Encouraged exercise Will start aricept 5mg QD  Plan as above [Time Spent: ___ minutes] : I have spent [unfilled] minutes of time on the encounter.

## 2024-04-23 NOTE — HISTORY OF PRESENT ILLNESS
[FreeTextEntry1] : Mrs. Gonzalez is here today as a follow up on her memory issues.  She is not aware of her issues, but as per the as per sister she is getting worse She never forget her sister, or other people but she may get confuse between two cousin  No hallucinations, no agitation She sleeps well  No falls or balance issues she does not drink  From last visit on 11/14/2023: Patient is a 64 y/o F with PMH borderline MR, pre-DM, osteoarthritis and colon cancer (s/p surgery and chemo/rad) who presents for follow up for memory impairment. Patient is accompanied by her sister. Patient states that sometimes she is very forgetful but does not remember how long it has been going on for. Patient's sister mentions that the patient has been having memory loss for about a year were forgot to turn the stove off, cannot manage bills hence her sister manages the finances and never drives. Patient worked in the [employer] in the past until the pandemic. Patient also complains that she has been forgetting faces, names and dates since last 1 yr. She also states that sometimes patient gets agitated but most of the times she has good mood. Education: completed high school She denies any diplopia, blurry vision, LOC, fall, trouble walking, hallucination, sleeps most of the day without any RBD, has good appetite, has full bowel and bladder control.  Family history: mother: Alzheimer's ds sister: pancreatic cancer father: heart ds  Reviewed: Labs below

## 2024-04-23 NOTE — ASSESSMENT
[FreeTextEntry1] : This 64y old female is following up for her memory issues. The history is suggestive of early onset AD. MR brain showed atrophy in bilateral medial temporal and parietal areas, with general nonspecific WM lesions.  B12 was at the lower level and was started on B12.  TSH was normal, and syphilis was negative.   Plan:  - Start B12 replacement (she will need to start parenteral for a few weeks) to be followed by her PCP - Start donepezil 5 mg daily  - Methylmalonic acid, and homocysteine - Keep good physical activity  - F/U 6 months

## 2024-04-23 NOTE — PHYSICAL EXAM
[FreeTextEntry1] : MS: Awake, alert, oriented to person, place, but not situation. She was able to tell the current month, and year,  but not her age. Follows commands.   Language: Speech is clear, fluent. No dysarthria.   CNs VFF. EOMI no nystagmus, no diplopia. No facial asymmetry b/l. Tongue midline, normal movements, no atrophy.   Motor: Normal muscle bulk & tone. No noticeable tremor or seizure. No pronator drift. Muscle strength of b/l UE and b/l LE 5/5. Delvalle and palmomental mildly positive    Sensation: Intact to LT b/l throughout   Cortical: No extinction   Coordination: Intact rapid-alternating movements. No dysmetria to finger to nose.   DTR: 2+ in biceps, brachioradialis and triceps; 1+ in patellar and absent ankle; planters are down b/l   Gait: No postural instability. Normal stance and tandem gait. Mild difficulty doing tandem heel gait

## 2024-04-25 ENCOUNTER — NON-APPOINTMENT (OUTPATIENT)
Age: 64
End: 2024-04-25

## 2024-04-25 DIAGNOSIS — R41.3 OTHER AMNESIA: ICD-10-CM

## 2024-04-30 LAB
HOMOCYSTEINE LEVEL: 17.1 UMOL/L
METHYLMALONATE SERPL-SCNC: 188 NMOL/L

## 2024-05-13 ENCOUNTER — APPOINTMENT (OUTPATIENT)
Dept: PODIATRY | Facility: CLINIC | Age: 64
End: 2024-05-13

## 2024-05-16 ENCOUNTER — APPOINTMENT (OUTPATIENT)
Dept: BREAST CENTER | Facility: CLINIC | Age: 64
End: 2024-05-16
Payer: MEDICAID

## 2024-05-16 VITALS
HEIGHT: 66 IN | SYSTOLIC BLOOD PRESSURE: 135 MMHG | HEART RATE: 74 BPM | WEIGHT: 235 LBS | BODY MASS INDEX: 37.77 KG/M2 | DIASTOLIC BLOOD PRESSURE: 84 MMHG

## 2024-05-16 DIAGNOSIS — N60.82 OTHER BENIGN MAMMARY DYSPLASIAS OF LEFT BREAST: ICD-10-CM

## 2024-05-16 PROCEDURE — 99203 OFFICE O/P NEW LOW 30 MIN: CPT

## 2024-05-16 NOTE — ASSESSMENT
[FreeTextEntry1] : Patient is a 64F who presents with concern of leftbreast palpable area.  She has bilateral scg mmg in 1/2024 that showed no suspicious findings (BIRADS 1).  We discussed her current symptoms in detail. We discussed she likely has a sebaceous cyst.  We discussed the importance of obtaining imaging to r/o any suspicious findings.  All questions and concerns were answered in detail.  Patient is for left mm/us now.  She is for follow up after imaging, pending any interval changes.  Total time spent on encounter was greater than 30 minutes , which included face to face time with the patient, performing an exam, reviewing previous medical records, reviewing current imaging/ pathology, documenting in patient record and coordinating care/imaging. Greater than 50% of the encounter was spent on counseling and coordination of her breast issue.

## 2024-05-16 NOTE — DATA REVIEWED
[FreeTextEntry1] : B/L Screening Mammo - 01/24/2024: MAMMOGRAM FINDINGS: Mammography was performed including the following views: bilateral craniocaudal with tomosynthesis, bilateral mediolateral oblique with tomosynthesis.  The examination includes digital synthetic 2D and digital tomosynthesis 3D images. Additional imaging analysis was performed using CAD (computer-aided detection) software.  There are scattered areas of fibroglandular density.  No suspicious mass, grouping of calcifications, or other abnormality is identified.  IMPRESSION: No mammographic evidence of malignancy.  RECOMMENDATION: Unless otherwise indicated by clinical findings, annual screening mammography recommended.  ASSESSMENT: BI-RADS Category 1:  Negative

## 2024-05-16 NOTE — HISTORY OF PRESENT ILLNESS
[FreeTextEntry1] : FAY CREWS is a 64-year-old female patient who presents today for initial consultation for complaints of left breast lump / pimple.   Her most recent imaging: B/L Screening Mammo - 01/24/2024: -There are scattered areas of fibroglandular density. -No suspicious mass, grouping of calcifications, or other abnormality is identified. -No mammographic evidence of malignancy. BI-RADS Category 1:  Negative  She notes a left skin lesion that looked like a pimple and drained purulent material. It has now improved.

## 2024-05-16 NOTE — PHYSICAL EXAM
[Normocephalic] : normocephalic [EOMI] : extra ocular movement intact [No Supraclavicular Adenopathy] : no supraclavicular adenopathy [No Cervical Adenopathy] : no cervical adenopathy [Examined in the supine and seated position] : examined in the supine and seated position [No dominant masses] : no dominant masses in right breast  [No Nipple Retraction] : no left nipple retraction [No Nipple Discharge] : no left nipple discharge [No Axillary Lymphadenopathy] : no left axillary lymphadenopathy [No Rashes] : no rashes [No Ulceration] : no ulceration [de-identified] : NL respirations [de-identified] : Medial breast skin lesion, likely irritated sebaceous cyst. No fluctuance no erythema

## 2024-07-03 ENCOUNTER — OUTPATIENT (OUTPATIENT)
Dept: OUTPATIENT SERVICES | Facility: HOSPITAL | Age: 64
LOS: 1 days | End: 2024-07-03
Payer: MEDICAID

## 2024-07-03 ENCOUNTER — APPOINTMENT (OUTPATIENT)
Dept: OPHTHALMOLOGY | Facility: CLINIC | Age: 64
End: 2024-07-03
Payer: MEDICAID

## 2024-07-03 DIAGNOSIS — Z90.49 ACQUIRED ABSENCE OF OTHER SPECIFIED PARTS OF DIGESTIVE TRACT: Chronic | ICD-10-CM

## 2024-07-03 DIAGNOSIS — H53.8 OTHER VISUAL DISTURBANCES: ICD-10-CM

## 2024-07-03 PROCEDURE — 92134 CPTRZ OPH DX IMG PST SGM RTA: CPT

## 2024-07-03 PROCEDURE — 92134 CPTRZ OPH DX IMG PST SGM RTA: CPT | Mod: 26

## 2024-07-03 PROCEDURE — 67028 INJECTION EYE DRUG: CPT | Mod: RT

## 2024-07-03 PROCEDURE — 92012 INTRM OPH EXAM EST PATIENT: CPT | Mod: 25

## 2024-07-03 PROCEDURE — 67028 INJECTION EYE DRUG: CPT | Mod: LT

## 2024-07-15 ENCOUNTER — OUTPATIENT (OUTPATIENT)
Dept: OUTPATIENT SERVICES | Facility: HOSPITAL | Age: 64
LOS: 1 days | End: 2024-07-15
Payer: MEDICAID

## 2024-07-15 ENCOUNTER — APPOINTMENT (OUTPATIENT)
Dept: PODIATRY | Facility: CLINIC | Age: 64
End: 2024-07-15
Payer: MEDICAID

## 2024-07-15 DIAGNOSIS — L60.3 NAIL DYSTROPHY: ICD-10-CM

## 2024-07-15 DIAGNOSIS — Z00.00 ENCOUNTER FOR GENERAL ADULT MEDICAL EXAMINATION WITHOUT ABNORMAL FINDINGS: ICD-10-CM

## 2024-07-15 DIAGNOSIS — M79.675 PAIN IN RIGHT TOE(S): ICD-10-CM

## 2024-07-15 DIAGNOSIS — M79.674 PAIN IN RIGHT TOE(S): ICD-10-CM

## 2024-07-15 PROCEDURE — 11721 DEBRIDE NAIL 6 OR MORE: CPT

## 2024-07-15 PROCEDURE — 11721 DEBRIDE NAIL 6 OR MORE: CPT | Mod: GC
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
## 2024-07-18 DIAGNOSIS — M79.674 PAIN IN RIGHT TOE(S): ICD-10-CM

## 2024-07-18 DIAGNOSIS — L60.3 NAIL DYSTROPHY: ICD-10-CM
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
## 2024-07-24 ENCOUNTER — RESULT REVIEW (OUTPATIENT)
Age: 64
End: 2024-07-24

## 2024-08-14 ENCOUNTER — APPOINTMENT (OUTPATIENT)
Dept: ORTHOPEDIC SURGERY | Facility: CLINIC | Age: 64
End: 2024-08-14

## 2024-08-15 ENCOUNTER — LABORATORY RESULT (OUTPATIENT)
Age: 64
End: 2024-08-15

## 2024-08-15 ENCOUNTER — APPOINTMENT (OUTPATIENT)
Dept: INTERNAL MEDICINE | Facility: CLINIC | Age: 64
End: 2024-08-15
Payer: MEDICAID

## 2024-08-15 VITALS
OXYGEN SATURATION: 98 % | SYSTOLIC BLOOD PRESSURE: 127 MMHG | HEIGHT: 66 IN | HEART RATE: 67 BPM | WEIGHT: 229 LBS | BODY MASS INDEX: 36.8 KG/M2 | TEMPERATURE: 97.2 F | DIASTOLIC BLOOD PRESSURE: 77 MMHG

## 2024-08-15 DIAGNOSIS — L85.8 OTHER SPECIFIED EPIDERMAL THICKENING: ICD-10-CM

## 2024-08-15 DIAGNOSIS — Z00.00 ENCOUNTER FOR GENERAL ADULT MEDICAL EXAMINATION W/OUT ABNORMAL FINDINGS: ICD-10-CM

## 2024-08-15 DIAGNOSIS — M19.90 UNSPECIFIED OSTEOARTHRITIS, UNSPECIFIED SITE: ICD-10-CM

## 2024-08-15 DIAGNOSIS — R73.03 PREDIABETES.: ICD-10-CM

## 2024-08-15 DIAGNOSIS — R41.3 OTHER AMNESIA: ICD-10-CM

## 2024-08-15 DIAGNOSIS — E66.9 OBESITY, UNSPECIFIED: ICD-10-CM

## 2024-08-15 PROCEDURE — 99214 OFFICE O/P EST MOD 30 MIN: CPT

## 2024-08-15 PROCEDURE — G2211 COMPLEX E/M VISIT ADD ON: CPT | Mod: NC,1L

## 2024-08-15 NOTE — END OF VISIT
[] : Resident [FreeTextEntry3] : I saw the patient, examined the patient independently, reviewed medical record, and provided the medical services. Agree with resident note as personally edited above. starting knee PT utilize topical nsaid prn derm referral for cutaneous horn on anterior neck- sister and patient express concern over this lesion

## 2024-08-15 NOTE — ASSESSMENT
[FreeTextEntry1] : Patient is a 65 y/o F with Pre-DM, HLD, osteoarthritis, hx of colon cancer (in late 30s) s/p resection and early onset AD presenting today for follow up.  #Epigastric discomfort - possibly early nsaid gastropath - Patient has been taking Aleve more than usual for knee OA (almost daily) - Patient advised to decrease use and use topical diclofenac on knees   #Knee pain secondary to osteoarthritis, controlled now - R knee > L knee  - Xray R knee May 2023 shows OA - Responds well to Alleive, however in setting of epigastric discomfort, patient advised to use diclofenac topical as needed counselled patient on how/when to take medication and about side effects - Follows with ortho, starting PT in 1 month   #Pre-DM #HLD - Last A1c: 5.9% (May 2023) - Last lipid profile (May 2023): TG 70, Chol 224, HDL 87, , NonHDL 137 - Counseled on weight loss and better diet control, no meds for now. - Counseled on low fat low cholesterol diet, aerobic exercise, weight loss importance  - Repeat labs ordererd  #Obesity - Counseled on low fat low cholesterol diet, aerobic exercise, and weight loss importance  - Weight at last visit 235 (May 2024) => 229 today  #Early onset Alzheimer's disease  - MR brain showed atrophy in bilateral medial temporal and parietal areas, with general nonspecific WM lesions. - Following with neuro, last seen in April 2024  - Started on B12 supplement and Donepezil 5mg daily in April 2024  - Patient is NOT compliant with medications, encouraged compliance   #HCM - Colonoscopy Jan 2024, needs to be repeated in 1 year due to inadequate prep - Colon Cancer Screening; Patient counseled on the importance of colonoscopy screening and directed to follow-up with GI doctor. Failure to perform test can result in Colon Cancer and Death. - B/l mammogram Jan 2024 BI-RADS 1 - Repeat routine labs - RTC 6 months and as needed

## 2024-08-15 NOTE — HISTORY OF PRESENT ILLNESS
[FreeTextEntry1] : Follow up. patient is here with her sister Nathan. [de-identified] : Patient is a 63 y/o F with Pre-DM, HLD, osteoarthritis, hx of colon cancer (in late 30s) s/p resection and early onset AD presenting today for follow up.  She is seeing ortho for knee OA, she is responding to Aleve, and has an appointment with physical therapy in 1 month. She has been using Alleve more than usual (almsot daily) and has been having some abdominal discomfort.  She is also following with neurology for memory loss but has not been taking her Vitamin B12 and Donepezil.

## 2024-08-15 NOTE — PHYSICAL EXAM
[No Acute Distress] : no acute distress [No Respiratory Distress] : no respiratory distress  [No Accessory Muscle Use] : no accessory muscle use [Clear to Auscultation] : lungs were clear to auscultation bilaterally [Normal Rate] : normal rate  [Regular Rhythm] : with a regular rhythm [Normal S1, S2] : normal S1 and S2 [Soft] : abdomen soft [Non Tender] : non-tender [Non-distended] : non-distended [No HSM] : no HSM [No Rash] : no rash [de-identified] : obese [de-identified] : L medial knee crepitus and tenderness without warmth or edema [de-identified] : cutaneous Horn on right neck

## 2024-08-15 NOTE — REVIEW OF SYSTEMS
[Heartburn] : heartburn [Fever] : no fever [Chills] : no chills [Fatigue] : no fatigue [Chest Pain] : no chest pain [Palpitations] : no palpitations [Shortness Of Breath] : no shortness of breath [Wheezing] : no wheezing [Cough] : no cough [Abdominal Pain] : no abdominal pain [Nausea] : no nausea [Constipation] : no constipation [Diarrhea] : diarrhea [Vomiting] : no vomiting [Melena] : no melena [Dysuria] : no dysuria [Headache] : no headache [FreeTextEntry7] : Epigastric discomfort occasionally [FreeTextEntry9] : Knee pain occasionally

## 2024-08-19 LAB
ALBUMIN SERPL ELPH-MCNC: 4.4 G/DL
ALP BLD-CCNC: 78 U/L
ALT SERPL-CCNC: 15 U/L
ANION GAP SERPL CALC-SCNC: 12 MMOL/L
AST SERPL-CCNC: 20 U/L
BILIRUB SERPL-MCNC: 0.9 MG/DL
BUN SERPL-MCNC: 18 MG/DL
CALCIUM SERPL-MCNC: 10.5 MG/DL
CHLORIDE SERPL-SCNC: 108 MMOL/L
CHOLEST SERPL-MCNC: 207 MG/DL
CO2 SERPL-SCNC: 27 MMOL/L
CREAT SERPL-MCNC: 0.9 MG/DL
EGFR: 72 ML/MIN/1.73M2
ESTIMATED AVERAGE GLUCOSE: 123 MG/DL
GLUCOSE SERPL-MCNC: 108 MG/DL
HBA1C MFR BLD HPLC: 5.9 %
HCT VFR BLD CALC: 37.3 %
HDLC SERPL-MCNC: 72 MG/DL
HGB BLD-MCNC: 12.5 G/DL
LDLC SERPL CALC-MCNC: 120 MG/DL
MCHC RBC-ENTMCNC: 33.5 G/DL
MCHC RBC-ENTMCNC: 34.1 PG
MCV RBC AUTO: 101.6 FL
NONHDLC SERPL-MCNC: 135 MG/DL
PLATELET # BLD AUTO: 193 K/UL
PMV BLD AUTO: 0 /100 WBCS
PMV BLD: 12.7 FL
POTASSIUM SERPL-SCNC: 4.7 MMOL/L
PROT SERPL-MCNC: 7.5 G/DL
RBC # BLD: 3.67 M/UL
RBC # FLD: 14 %
SODIUM SERPL-SCNC: 147 MMOL/L
TRIGL SERPL-MCNC: 77 MG/DL
TSH SERPL-ACNC: 2.12 UIU/ML
WBC # FLD AUTO: 7.26 K/UL

## 2024-08-30 ENCOUNTER — APPOINTMENT (OUTPATIENT)
Dept: GASTROENTEROLOGY | Facility: CLINIC | Age: 64
End: 2024-08-30
Payer: MEDICAID

## 2024-08-30 ENCOUNTER — OUTPATIENT (OUTPATIENT)
Dept: OUTPATIENT SERVICES | Facility: HOSPITAL | Age: 64
LOS: 1 days | End: 2024-08-30
Payer: MEDICAID

## 2024-08-30 ENCOUNTER — NON-APPOINTMENT (OUTPATIENT)
Age: 64
End: 2024-08-30

## 2024-08-30 VITALS
HEART RATE: 68 BPM | SYSTOLIC BLOOD PRESSURE: 122 MMHG | BODY MASS INDEX: 37 KG/M2 | OXYGEN SATURATION: 97 % | HEIGHT: 66 IN | WEIGHT: 230.25 LBS | TEMPERATURE: 97.7 F | DIASTOLIC BLOOD PRESSURE: 78 MMHG

## 2024-08-30 DIAGNOSIS — Z12.11 ENCOUNTER FOR SCREENING FOR MALIGNANT NEOPLASM OF COLON: ICD-10-CM

## 2024-08-30 DIAGNOSIS — Z00.00 ENCOUNTER FOR GENERAL ADULT MEDICAL EXAMINATION WITHOUT ABNORMAL FINDINGS: ICD-10-CM

## 2024-08-30 DIAGNOSIS — Z85.038 ENCOUNTER FOR SCREENING FOR MALIGNANT NEOPLASM OF COLON: ICD-10-CM

## 2024-08-30 PROCEDURE — 99212 OFFICE O/P EST SF 10 MIN: CPT

## 2024-08-30 NOTE — ASSESSMENT
[FreeTextEntry1] : This is a 64 y/o F with PMH of Colon cancer s/p Rt hemicolectomy and chemo ~26 years ago, presenting for follow up after last colonoscopy  #screening colonoscopy  - History of Colon cancer s/p rt hemicolectomy and chemo 26 year ago, was following up     with Dr Springer - Not on any AC - report on 1/24 removal of 4 benign appearing polyps; however, prep was not done well and needs to be repeated in one year on 1/2025 - bowel prep ordered 2L golyt 2days before, 4L day before and Dulcolax pills  - discussed risks and benefit with the patient. - clear liquids the day before  #Chronic Diarrhea   - Non-bloody, non-mucoid  - Darriah since her diagnosis of colon ca associated with dairy products, denies     nocturnal diarrhea  - Lactose 9000u with dairy intake  # Chronic Macrocytic anemia  - Hb 12.5, .6 in August 2024   - Denies any signs of active GI Bleed  - Iron studies, B12, Folate all WNL

## 2024-08-30 NOTE — HISTORY OF PRESENT ILLNESS
[FreeTextEntry1] : This is a 64 y/o F with PMH of Colon cancer s/p Rt hemicolectomy and chemo ~26 years ago. Was following up with Dr Springer for surveillance colonoscopy, last colonoscopy on 1/24, however the prep was poor and needs to be redone in a year. Presents today for regular follow up for surveillance colonoscopy.  Patient denies abdominal pain, has chronic Darriah since her diagnosis of colon ca associated with dairy products, denies nocturnal diarrhea, constipation. Patient denies any melena, hematochezia, hematemesis. Patient takes 440mg Aleve when she needs, denies alcohol or tobacco use.

## 2024-09-04 ENCOUNTER — APPOINTMENT (OUTPATIENT)
Dept: OPHTHALMOLOGY | Facility: CLINIC | Age: 64
End: 2024-09-04

## 2024-09-04 ENCOUNTER — OUTPATIENT (OUTPATIENT)
Dept: OUTPATIENT SERVICES | Facility: HOSPITAL | Age: 64
LOS: 1 days | End: 2024-09-04
Payer: MEDICAID

## 2024-09-04 DIAGNOSIS — H53.8 OTHER VISUAL DISTURBANCES: ICD-10-CM

## 2024-09-04 DIAGNOSIS — Z90.49 ACQUIRED ABSENCE OF OTHER SPECIFIED PARTS OF DIGESTIVE TRACT: Chronic | ICD-10-CM

## 2024-09-04 PROCEDURE — 67028 INJECTION EYE DRUG: CPT | Mod: LT

## 2024-09-04 PROCEDURE — 92012 INTRM OPH EXAM EST PATIENT: CPT | Mod: 25

## 2024-09-04 PROCEDURE — 92134 CPTRZ OPH DX IMG PST SGM RTA: CPT

## 2024-09-04 PROCEDURE — 92134 CPTRZ OPH DX IMG PST SGM RTA: CPT | Mod: 26

## 2024-09-05 ENCOUNTER — OUTPATIENT (OUTPATIENT)
Dept: OUTPATIENT SERVICES | Facility: HOSPITAL | Age: 64
LOS: 1 days | End: 2024-09-05

## 2024-09-05 ENCOUNTER — APPOINTMENT (OUTPATIENT)
Dept: DERMATOLOGY | Facility: CLINIC | Age: 64
End: 2024-09-05

## 2024-09-05 DIAGNOSIS — L85.3 XEROSIS CUTIS: ICD-10-CM

## 2024-09-05 DIAGNOSIS — Z00.00 ENCOUNTER FOR GENERAL ADULT MEDICAL EXAMINATION WITHOUT ABNORMAL FINDINGS: ICD-10-CM

## 2024-09-05 DIAGNOSIS — B07.9 VIRAL WART, UNSPECIFIED: ICD-10-CM

## 2024-09-05 DIAGNOSIS — Z90.49 ACQUIRED ABSENCE OF OTHER SPECIFIED PARTS OF DIGESTIVE TRACT: Chronic | ICD-10-CM

## 2024-09-05 PROCEDURE — 99204 OFFICE O/P NEW MOD 45 MIN: CPT

## 2024-09-05 RX ORDER — TRIAMCINOLONE ACETONIDE 1 MG/ML
0.1 LOTION TOPICAL TWICE DAILY
Qty: 1 | Refills: 2 | Status: ACTIVE | COMMUNITY
Start: 2024-09-05 | End: 1900-01-01

## 2024-09-05 NOTE — ASSESSMENT
[FreeTextEntry1] : # Irritated right neck wart increasing in size: options, risks, benefits and alternatives discussed, including destruction and no destruction:   -liquid nitrogen freezing done 4 sec twice after verbal consent, risk of scar, bleeding, infection, scar discussed. -follow up in 4-6 weeks for wart shaving if lesion persist   #left arm seborrheic keratosis lesions that have been present for years : patient reassured benign -apply sunscreen when going out  #Venous stasis dermatitis with dry skin shins blt:  -apply triamcinolone 0.1% ointment twice daily to bilateral lower limbs.   Follow up in 4-6 weeks

## 2024-09-05 NOTE — PHYSICAL EXAM
[Alert] : alert [Well Nourished] : well nourished [FreeTextEntry3] : ~ 0.5 cm  verrucous eryth pap  on the neck with thrombosed capillaries - hyperpigmented papules with regular color and border on left upper extremity/ dorsal forearm with regular color and border. - xerotic scaly patches blt shins with stasis changes ~ 1+ blt LE edema

## 2024-09-05 NOTE — HISTORY OF PRESENT ILLNESS
[FreeTextEntry1] : right neck and L arm lesions [de-identified] : c/o right neck wart that appeared 1 year , irritated and increasing in size x months.  Also c/o left arm dark lesion that has been present for years, no changes, no sx. Also with scaly patches dry and irritated blt shins, not using any meds, with some blt LE edema.  No sob. No h/o skin cancers.

## 2024-09-11 DIAGNOSIS — L82.1 OTHER SEBORRHEIC KERATOSIS: ICD-10-CM

## 2024-09-11 DIAGNOSIS — I87.2 VENOUS INSUFFICIENCY (CHRONIC) (PERIPHERAL): ICD-10-CM

## 2024-09-11 DIAGNOSIS — B07.9 VIRAL WART, UNSPECIFIED: ICD-10-CM

## 2024-09-17 DIAGNOSIS — H40.52X0 GLAUCOMA SECONDARY TO OTHER EYE DISORDERS, LEFT EYE, STAGE UNSPECIFIED: ICD-10-CM

## 2024-09-17 DIAGNOSIS — H35.81 RETINAL EDEMA: ICD-10-CM

## 2024-09-17 DIAGNOSIS — H43.829 VITREOMACULAR ADHESION, UNSPECIFIED EYE: ICD-10-CM

## 2024-09-17 DIAGNOSIS — H35.039 HYPERTENSIVE RETINOPATHY, UNSPECIFIED EYE: ICD-10-CM

## 2024-09-17 DIAGNOSIS — H35.89 OTHER SPECIFIED RETINAL DISORDERS: ICD-10-CM

## 2024-09-17 DIAGNOSIS — H34.8392 TRIBUTARY (BRANCH) RETINAL VEIN OCCLUSION, UNSPECIFIED EYE, STABLE: ICD-10-CM

## 2024-09-17 DIAGNOSIS — H34.8122 CENTRAL RETINAL VEIN OCCLUSION, LEFT EYE, STABLE: ICD-10-CM

## 2024-10-16 ENCOUNTER — APPOINTMENT (OUTPATIENT)
Dept: OPHTHALMOLOGY | Facility: CLINIC | Age: 64
End: 2024-10-16

## 2024-10-16 ENCOUNTER — OUTPATIENT (OUTPATIENT)
Dept: OUTPATIENT SERVICES | Facility: HOSPITAL | Age: 64
LOS: 1 days | End: 2024-10-16
Payer: MEDICAID

## 2024-10-16 DIAGNOSIS — H53.8 OTHER VISUAL DISTURBANCES: ICD-10-CM

## 2024-10-16 PROCEDURE — 92201 OPSCPY EXTND RTA DRAW UNI/BI: CPT | Mod: XU

## 2024-10-16 PROCEDURE — 92012 INTRM OPH EXAM EST PATIENT: CPT | Mod: 25

## 2024-10-16 PROCEDURE — 67028 INJECTION EYE DRUG: CPT | Mod: 50

## 2024-10-16 PROCEDURE — 67028 INJECTION EYE DRUG: CPT | Mod: LT

## 2024-10-21 ENCOUNTER — APPOINTMENT (OUTPATIENT)
Dept: PODIATRY | Facility: CLINIC | Age: 64
End: 2024-10-21

## 2024-11-04 ENCOUNTER — APPOINTMENT (OUTPATIENT)
Dept: PODIATRY | Facility: CLINIC | Age: 64
End: 2024-11-04
Payer: MEDICAID

## 2024-11-04 ENCOUNTER — OUTPATIENT (OUTPATIENT)
Dept: OUTPATIENT SERVICES | Facility: HOSPITAL | Age: 64
LOS: 1 days | End: 2024-11-04
Payer: MEDICAID

## 2024-11-04 DIAGNOSIS — M79.674 PAIN IN RIGHT TOE(S): ICD-10-CM
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
## 2024-11-04 DIAGNOSIS — M79.675 PAIN IN RIGHT TOE(S): ICD-10-CM

## 2024-11-04 DIAGNOSIS — Z90.49 ACQUIRED ABSENCE OF OTHER SPECIFIED PARTS OF DIGESTIVE TRACT: Chronic | ICD-10-CM

## 2024-11-04 DIAGNOSIS — L60.3 NAIL DYSTROPHY: ICD-10-CM

## 2024-11-04 DIAGNOSIS — Z00.00 ENCOUNTER FOR GENERAL ADULT MEDICAL EXAMINATION WITHOUT ABNORMAL FINDINGS: ICD-10-CM
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
## 2024-11-04 PROCEDURE — 11721 DEBRIDE NAIL 6 OR MORE: CPT

## 2024-11-12 ENCOUNTER — APPOINTMENT (OUTPATIENT)
Dept: NEUROLOGY | Facility: CLINIC | Age: 64
End: 2024-11-12
Payer: MEDICAID

## 2024-11-12 ENCOUNTER — OUTPATIENT (OUTPATIENT)
Dept: OUTPATIENT SERVICES | Facility: HOSPITAL | Age: 64
LOS: 1 days | End: 2024-11-12
Payer: MEDICAID

## 2024-11-12 VITALS
WEIGHT: 231 LBS | HEART RATE: 64 BPM | OXYGEN SATURATION: 100 % | DIASTOLIC BLOOD PRESSURE: 85 MMHG | SYSTOLIC BLOOD PRESSURE: 135 MMHG | BODY MASS INDEX: 37.28 KG/M2

## 2024-11-12 DIAGNOSIS — Z00.00 ENCOUNTER FOR GENERAL ADULT MEDICAL EXAMINATION WITHOUT ABNORMAL FINDINGS: ICD-10-CM

## 2024-11-12 DIAGNOSIS — Z90.49 ACQUIRED ABSENCE OF OTHER SPECIFIED PARTS OF DIGESTIVE TRACT: Chronic | ICD-10-CM

## 2024-11-12 PROCEDURE — 99214 OFFICE O/P EST MOD 30 MIN: CPT

## 2024-11-12 RX ORDER — DONEPEZIL HYDROCHLORIDE 10 MG/1
10 TABLET ORAL DAILY
Qty: 30 | Refills: 5 | Status: ACTIVE | COMMUNITY
Start: 2024-11-12 | End: 1900-01-01

## 2024-11-14 ENCOUNTER — NON-APPOINTMENT (OUTPATIENT)
Age: 64
End: 2024-11-14

## 2024-11-14 DIAGNOSIS — R41.89 OTHER SYMPTOMS AND SIGNS INVOLVING COGNITIVE FUNCTIONS AND AWARENESS: ICD-10-CM

## 2024-11-14 DIAGNOSIS — R41.3 OTHER AMNESIA: ICD-10-CM

## 2024-11-27 ENCOUNTER — OUTPATIENT (OUTPATIENT)
Dept: OUTPATIENT SERVICES | Facility: HOSPITAL | Age: 64
LOS: 1 days | End: 2024-11-27

## 2024-11-27 ENCOUNTER — APPOINTMENT (OUTPATIENT)
Dept: OPHTHALMOLOGY | Facility: CLINIC | Age: 64
End: 2024-11-27

## 2024-11-27 DIAGNOSIS — Z90.49 ACQUIRED ABSENCE OF OTHER SPECIFIED PARTS OF DIGESTIVE TRACT: Chronic | ICD-10-CM

## 2024-11-27 DIAGNOSIS — H53.8 OTHER VISUAL DISTURBANCES: ICD-10-CM

## 2024-11-27 PROCEDURE — 92134 CPTRZ OPH DX IMG PST SGM RTA: CPT

## 2024-11-27 PROCEDURE — 92201 OPSCPY EXTND RTA DRAW UNI/BI: CPT | Mod: 25

## 2024-11-27 PROCEDURE — 67028 INJECTION EYE DRUG: CPT | Mod: LT

## 2024-11-27 PROCEDURE — 92134 CPTRZ OPH DX IMG PST SGM RTA: CPT | Mod: 26

## 2024-11-27 PROCEDURE — 67028 INJECTION EYE DRUG: CPT | Mod: 50

## 2024-11-27 PROCEDURE — 92201 OPSCPY EXTND RTA DRAW UNI/BI: CPT

## 2024-11-27 PROCEDURE — 92012 INTRM OPH EXAM EST PATIENT: CPT | Mod: 25

## 2025-01-02 ENCOUNTER — NON-APPOINTMENT (OUTPATIENT)
Age: 65
End: 2025-01-02

## 2025-01-03 RX ORDER — BISACODYL 5 MG
4 TABLET, DELAYED RELEASE (ENTERIC COATED) ORAL
Qty: 4 | Refills: 0
Start: 2025-01-03 | End: 2025-03-07

## 2025-01-03 RX ORDER — POLYETHYLENE GLYCOL-3350 AND ELECTROLYTES 236; 6.74; 5.86; 2.97; 22.74 G/274.31G; G/274.31G; G/274.31G; G/274.31G; G/274.31G
1 POWDER, FOR SOLUTION ORAL
Qty: 2 | Refills: 0
Start: 2025-01-03 | End: 2025-03-08

## 2025-01-08 ENCOUNTER — APPOINTMENT (OUTPATIENT)
Dept: GASTROENTEROLOGY | Facility: CLINIC | Age: 65
End: 2025-01-08

## 2025-02-05 ENCOUNTER — APPOINTMENT (OUTPATIENT)
Dept: OPHTHALMOLOGY | Facility: CLINIC | Age: 65
End: 2025-02-05

## 2025-02-05 ENCOUNTER — OUTPATIENT (OUTPATIENT)
Dept: OUTPATIENT SERVICES | Facility: HOSPITAL | Age: 65
LOS: 1 days | End: 2025-02-05

## 2025-02-05 DIAGNOSIS — H53.8 OTHER VISUAL DISTURBANCES: ICD-10-CM

## 2025-02-05 DIAGNOSIS — Z90.49 ACQUIRED ABSENCE OF OTHER SPECIFIED PARTS OF DIGESTIVE TRACT: Chronic | ICD-10-CM

## 2025-02-05 PROCEDURE — 67028 INJECTION EYE DRUG: CPT | Mod: LT

## 2025-02-10 ENCOUNTER — OUTPATIENT (OUTPATIENT)
Dept: OUTPATIENT SERVICES | Facility: HOSPITAL | Age: 65
LOS: 1 days | End: 2025-02-10
Payer: MEDICAID

## 2025-02-10 ENCOUNTER — APPOINTMENT (OUTPATIENT)
Dept: PODIATRY | Facility: CLINIC | Age: 65
End: 2025-02-10
Payer: MEDICAID

## 2025-02-10 DIAGNOSIS — L60.3 NAIL DYSTROPHY: ICD-10-CM

## 2025-02-10 DIAGNOSIS — Z90.49 ACQUIRED ABSENCE OF OTHER SPECIFIED PARTS OF DIGESTIVE TRACT: Chronic | ICD-10-CM

## 2025-02-10 DIAGNOSIS — Z00.00 ENCOUNTER FOR GENERAL ADULT MEDICAL EXAMINATION WITHOUT ABNORMAL FINDINGS: ICD-10-CM

## 2025-02-10 DIAGNOSIS — M79.674 PAIN IN RIGHT TOE(S): ICD-10-CM

## 2025-02-10 DIAGNOSIS — M79.675 PAIN IN RIGHT TOE(S): ICD-10-CM

## 2025-02-10 PROCEDURE — 11721 DEBRIDE NAIL 6 OR MORE: CPT

## 2025-02-21 DIAGNOSIS — L60.3 NAIL DYSTROPHY: ICD-10-CM

## 2025-02-21 DIAGNOSIS — M79.674 PAIN IN RIGHT TOE(S): ICD-10-CM
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
## 2025-02-24 ENCOUNTER — OUTPATIENT (OUTPATIENT)
Dept: OUTPATIENT SERVICES | Facility: HOSPITAL | Age: 65
LOS: 1 days | End: 2025-02-24
Payer: MEDICAID

## 2025-02-24 ENCOUNTER — APPOINTMENT (OUTPATIENT)
Dept: INTERNAL MEDICINE | Facility: CLINIC | Age: 65
End: 2025-02-24
Payer: MEDICAID

## 2025-02-24 VITALS
HEIGHT: 66 IN | BODY MASS INDEX: 38.57 KG/M2 | WEIGHT: 240 LBS | HEART RATE: 74 BPM | SYSTOLIC BLOOD PRESSURE: 126 MMHG | TEMPERATURE: 97.9 F | DIASTOLIC BLOOD PRESSURE: 84 MMHG | OXYGEN SATURATION: 100 %

## 2025-02-24 DIAGNOSIS — M25.561 PAIN IN RIGHT KNEE: ICD-10-CM

## 2025-02-24 DIAGNOSIS — E66.9 OBESITY, UNSPECIFIED: ICD-10-CM

## 2025-02-24 DIAGNOSIS — R41.3 OTHER AMNESIA: ICD-10-CM

## 2025-02-24 DIAGNOSIS — Z00.00 ENCOUNTER FOR GENERAL ADULT MEDICAL EXAMINATION W/OUT ABNORMAL FINDINGS: ICD-10-CM

## 2025-02-24 DIAGNOSIS — R73.03 PREDIABETES.: ICD-10-CM

## 2025-02-24 DIAGNOSIS — Z90.49 ACQUIRED ABSENCE OF OTHER SPECIFIED PARTS OF DIGESTIVE TRACT: Chronic | ICD-10-CM

## 2025-02-24 DIAGNOSIS — Z00.00 ENCOUNTER FOR GENERAL ADULT MEDICAL EXAMINATION WITHOUT ABNORMAL FINDINGS: ICD-10-CM

## 2025-02-24 LAB
ALBUMIN SERPL ELPH-MCNC: 4.1 G/DL
ALP BLD-CCNC: 67 U/L
ALT SERPL-CCNC: 23 U/L
ANION GAP SERPL CALC-SCNC: 12 MMOL/L
AST SERPL-CCNC: 25 U/L
BASOPHILS # BLD AUTO: 0.04 K/UL
BASOPHILS NFR BLD AUTO: 0.9 %
BILIRUB SERPL-MCNC: 0.6 MG/DL
BUN SERPL-MCNC: 17 MG/DL
CALCIUM SERPL-MCNC: 9.6 MG/DL
CHLORIDE SERPL-SCNC: 106 MMOL/L
CHOLEST SERPL-MCNC: 203 MG/DL
CO2 SERPL-SCNC: 25 MMOL/L
CREAT SERPL-MCNC: 0.8 MG/DL
EGFR: 83 ML/MIN/1.73M2
EOSINOPHIL # BLD AUTO: 0.1 K/UL
EOSINOPHIL NFR BLD AUTO: 2.2 %
ESTIMATED AVERAGE GLUCOSE: 123 MG/DL
GLUCOSE SERPL-MCNC: 97 MG/DL
HBA1C MFR BLD HPLC: 5.9 %
HCT VFR BLD CALC: 37.4 %
HDLC SERPL-MCNC: 82 MG/DL
HGB BLD-MCNC: 12.2 G/DL
IMM GRANULOCYTES NFR BLD AUTO: 0.2 %
LDLC SERPL CALC-MCNC: 108 MG/DL
LYMPHOCYTES # BLD AUTO: 1.16 K/UL
LYMPHOCYTES NFR BLD AUTO: 25.3 %
MAN DIFF?: NORMAL
MCHC RBC-ENTMCNC: 32.6 G/DL
MCHC RBC-ENTMCNC: 33.4 PG
MCV RBC AUTO: 102.5 FL
MONOCYTES # BLD AUTO: 0.39 K/UL
MONOCYTES NFR BLD AUTO: 8.5 %
NEUTROPHILS # BLD AUTO: 2.89 K/UL
NEUTROPHILS NFR BLD AUTO: 62.9 %
NONHDLC SERPL-MCNC: 121 MG/DL
PLATELET # BLD AUTO: 216 K/UL
PMV BLD AUTO: 0 /100 WBCS
PMV BLD: 11.5 FL
POTASSIUM SERPL-SCNC: 4.7 MMOL/L
PROT SERPL-MCNC: 7.2 G/DL
RBC # BLD: 3.65 M/UL
RBC # FLD: 13.6 %
SODIUM SERPL-SCNC: 143 MMOL/L
TRIGL SERPL-MCNC: 70 MG/DL
TSH SERPL-ACNC: 2.17 UIU/ML
WBC # FLD AUTO: 4.59 K/UL

## 2025-02-24 PROCEDURE — 83036 HEMOGLOBIN GLYCOSYLATED A1C: CPT

## 2025-02-24 PROCEDURE — G2211 COMPLEX E/M VISIT ADD ON: CPT | Mod: NC

## 2025-02-24 PROCEDURE — 85025 COMPLETE CBC W/AUTO DIFF WBC: CPT

## 2025-02-24 PROCEDURE — 99214 OFFICE O/P EST MOD 30 MIN: CPT

## 2025-02-24 PROCEDURE — 80061 LIPID PANEL: CPT

## 2025-02-24 PROCEDURE — 84443 ASSAY THYROID STIM HORMONE: CPT

## 2025-02-24 PROCEDURE — 80053 COMPREHEN METABOLIC PANEL: CPT

## 2025-02-24 RX ORDER — DICLOFENAC SODIUM 10 MG/G
1 GEL TOPICAL
Qty: 100 | Refills: 1 | Status: ACTIVE | COMMUNITY
Start: 2025-02-24 | End: 1900-01-01

## 2025-03-03 ENCOUNTER — NON-APPOINTMENT (OUTPATIENT)
Age: 65
End: 2025-03-03

## 2025-03-04 DIAGNOSIS — Z00.00 ENCOUNTER FOR GENERAL ADULT MEDICAL EXAMINATION WITHOUT ABNORMAL FINDINGS: ICD-10-CM

## 2025-03-04 DIAGNOSIS — R73.03 PREDIABETES: ICD-10-CM

## 2025-03-04 DIAGNOSIS — R41.3 OTHER AMNESIA: ICD-10-CM

## 2025-03-04 DIAGNOSIS — M25.561 PAIN IN RIGHT KNEE: ICD-10-CM

## 2025-03-04 DIAGNOSIS — E66.9 OBESITY, UNSPECIFIED: ICD-10-CM

## 2025-03-05 ENCOUNTER — OUTPATIENT (OUTPATIENT)
Dept: OUTPATIENT SERVICES | Facility: HOSPITAL | Age: 65
LOS: 1 days | End: 2025-03-05
Payer: MEDICAID

## 2025-03-05 ENCOUNTER — APPOINTMENT (OUTPATIENT)
Dept: ORTHOPEDIC SURGERY | Facility: CLINIC | Age: 65
End: 2025-03-05

## 2025-03-05 DIAGNOSIS — Z90.49 ACQUIRED ABSENCE OF OTHER SPECIFIED PARTS OF DIGESTIVE TRACT: Chronic | ICD-10-CM

## 2025-03-05 DIAGNOSIS — Z00.00 ENCOUNTER FOR GENERAL ADULT MEDICAL EXAMINATION WITHOUT ABNORMAL FINDINGS: ICD-10-CM

## 2025-03-05 PROCEDURE — 99214 OFFICE O/P EST MOD 30 MIN: CPT

## 2025-03-06 ENCOUNTER — NON-APPOINTMENT (OUTPATIENT)
Age: 65
End: 2025-03-06

## 2025-03-06 DIAGNOSIS — M25.561 PAIN IN RIGHT KNEE: ICD-10-CM
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
## 2025-03-11 ENCOUNTER — NON-APPOINTMENT (OUTPATIENT)
Age: 65
End: 2025-03-11

## 2025-03-26 ENCOUNTER — APPOINTMENT (OUTPATIENT)
Dept: OPHTHALMOLOGY | Facility: CLINIC | Age: 65
End: 2025-03-26

## 2025-04-02 ENCOUNTER — APPOINTMENT (OUTPATIENT)
Dept: ORTHOPEDIC SURGERY | Facility: CLINIC | Age: 65
End: 2025-04-02

## 2025-04-02 ENCOUNTER — OUTPATIENT (OUTPATIENT)
Dept: OUTPATIENT SERVICES | Facility: HOSPITAL | Age: 65
LOS: 1 days | End: 2025-04-02
Payer: MEDICAID

## 2025-04-02 DIAGNOSIS — M17.11 UNILATERAL PRIMARY OSTEOARTHRITIS, RIGHT KNEE: ICD-10-CM

## 2025-04-02 DIAGNOSIS — Z90.49 ACQUIRED ABSENCE OF OTHER SPECIFIED PARTS OF DIGESTIVE TRACT: Chronic | ICD-10-CM

## 2025-04-02 DIAGNOSIS — Z00.00 ENCOUNTER FOR GENERAL ADULT MEDICAL EXAMINATION WITHOUT ABNORMAL FINDINGS: ICD-10-CM

## 2025-04-02 PROCEDURE — 99212 OFFICE O/P EST SF 10 MIN: CPT
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
## 2025-04-05 DIAGNOSIS — M17.11 UNILATERAL PRIMARY OSTEOARTHRITIS, RIGHT KNEE: ICD-10-CM
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
## 2025-05-12 ENCOUNTER — APPOINTMENT (OUTPATIENT)
Dept: PODIATRY | Facility: CLINIC | Age: 65
End: 2025-05-12

## 2025-05-13 ENCOUNTER — APPOINTMENT (OUTPATIENT)
Dept: NEUROLOGY | Facility: CLINIC | Age: 65
End: 2025-05-13

## 2025-05-14 ENCOUNTER — APPOINTMENT (OUTPATIENT)
Dept: OPHTHALMOLOGY | Facility: CLINIC | Age: 65
End: 2025-05-14

## 2025-06-18 ENCOUNTER — APPOINTMENT (OUTPATIENT)
Dept: ORTHOPEDIC SURGERY | Facility: CLINIC | Age: 65
End: 2025-06-18

## 2025-06-18 ENCOUNTER — OUTPATIENT (OUTPATIENT)
Dept: OUTPATIENT SERVICES | Facility: HOSPITAL | Age: 65
LOS: 1 days | End: 2025-06-18
Payer: MEDICAID

## 2025-06-18 DIAGNOSIS — Z90.49 ACQUIRED ABSENCE OF OTHER SPECIFIED PARTS OF DIGESTIVE TRACT: Chronic | ICD-10-CM

## 2025-06-18 DIAGNOSIS — Z00.00 ENCOUNTER FOR GENERAL ADULT MEDICAL EXAMINATION WITHOUT ABNORMAL FINDINGS: ICD-10-CM

## 2025-06-18 PROCEDURE — 99212 OFFICE O/P EST SF 10 MIN: CPT
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
## 2025-07-01 DIAGNOSIS — M25.569 PAIN IN UNSPECIFIED KNEE: ICD-10-CM
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
## 2025-07-02 ENCOUNTER — APPOINTMENT (OUTPATIENT)
Dept: OPHTHALMOLOGY | Facility: CLINIC | Age: 65
End: 2025-07-02

## 2025-07-26 ENCOUNTER — EMERGENCY (EMERGENCY)
Facility: HOSPITAL | Age: 65
LOS: 0 days | Discharge: HOME | End: 2025-07-26
Attending: EMERGENCY MEDICINE
Payer: MEDICAID

## 2025-07-26 VITALS
OXYGEN SATURATION: 100 % | TEMPERATURE: 98 F | DIASTOLIC BLOOD PRESSURE: 84 MMHG | HEART RATE: 90 BPM | SYSTOLIC BLOOD PRESSURE: 135 MMHG | RESPIRATION RATE: 16 BRPM

## 2025-07-26 DIAGNOSIS — S70.362A INSECT BITE (NONVENOMOUS), LEFT THIGH, INITIAL ENCOUNTER: ICD-10-CM

## 2025-07-26 DIAGNOSIS — Z85.038 PERSONAL HISTORY OF OTHER MALIGNANT NEOPLASM OF LARGE INTESTINE: ICD-10-CM

## 2025-07-26 DIAGNOSIS — Z90.49 ACQUIRED ABSENCE OF OTHER SPECIFIED PARTS OF DIGESTIVE TRACT: Chronic | ICD-10-CM
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
## 2025-07-26 PROCEDURE — 99282 EMERGENCY DEPT VISIT SF MDM: CPT

## 2025-07-26 PROCEDURE — 99283 EMERGENCY DEPT VISIT LOW MDM: CPT

## 2025-08-13 ENCOUNTER — APPOINTMENT (OUTPATIENT)
Dept: ORTHOPEDIC SURGERY | Facility: CLINIC | Age: 65
End: 2025-08-13

## 2025-08-20 ENCOUNTER — NON-APPOINTMENT (OUTPATIENT)
Age: 65
End: 2025-08-20

## 2025-09-08 ENCOUNTER — NON-APPOINTMENT (OUTPATIENT)
Age: 65
End: 2025-09-08

## 2025-09-10 ENCOUNTER — NON-APPOINTMENT (OUTPATIENT)
Age: 65
End: 2025-09-10